# Patient Record
Sex: MALE | Race: WHITE | Employment: FULL TIME | URBAN - METROPOLITAN AREA
[De-identification: names, ages, dates, MRNs, and addresses within clinical notes are randomized per-mention and may not be internally consistent; named-entity substitution may affect disease eponyms.]

---

## 2017-05-30 ENCOUNTER — HOSPITAL ENCOUNTER (OUTPATIENT)
Dept: OTHER | Age: 52
Discharge: HOME | End: 2017-05-30
Attending: INTERNAL MEDICINE | Admitting: INTERNAL MEDICINE

## 2017-05-30 DIAGNOSIS — D64.9 NORMOCHROMIC NORMOCYTIC ANEMIA: ICD-10-CM

## 2017-05-30 DIAGNOSIS — E78.5 ELEVATED LIPIDS: ICD-10-CM

## 2017-05-30 LAB
A/G RATIO: 1.8 (ref 1.1–2.2)
ALBUMIN SERPL-MCNC: 4.4 G/DL (ref 3.4–5)
ALP BLD-CCNC: 47 U/L (ref 40–129)
ALT SERPL-CCNC: 14 U/L (ref 10–40)
ANION GAP SERPL CALCULATED.3IONS-SCNC: 9 MMOL/L (ref 3–16)
AST SERPL-CCNC: 16 U/L (ref 15–37)
BILIRUB SERPL-MCNC: 0.5 MG/DL (ref 0–1)
BUN BLDV-MCNC: 19 MG/DL (ref 7–20)
CALCIUM SERPL-MCNC: 9.4 MG/DL (ref 8.3–10.6)
CHLORIDE BLD-SCNC: 101 MMOL/L (ref 99–110)
CHOLESTEROL, TOTAL: 201 MG/DL (ref 0–199)
CO2: 28 MMOL/L (ref 21–32)
CREAT SERPL-MCNC: 0.9 MG/DL (ref 0.9–1.3)
GFR AFRICAN AMERICAN: >60
GFR NON-AFRICAN AMERICAN: >60
GLOBULIN: 2.4 G/DL
GLUCOSE BLD-MCNC: 98 MG/DL (ref 70–99)
HCT VFR BLD CALC: 40 % (ref 40.5–52.5)
HDLC SERPL-MCNC: 46 MG/DL (ref 40–60)
HEMOGLOBIN: 13.6 G/DL (ref 13.5–17.5)
LDL CHOLESTEROL CALCULATED: 135 MG/DL
MCH RBC QN AUTO: 29.8 PG (ref 26–34)
MCHC RBC AUTO-ENTMCNC: 34.1 G/DL (ref 31–36)
MCV RBC AUTO: 87.4 FL (ref 80–100)
PDW BLD-RTO: 12.4 % (ref 12.4–15.4)
PLATELET # BLD: 212 K/UL (ref 135–450)
PMV BLD AUTO: 8.7 FL (ref 5–10.5)
POTASSIUM SERPL-SCNC: 4.5 MMOL/L (ref 3.5–5.1)
RBC # BLD: 4.57 M/UL (ref 4.2–5.9)
SODIUM BLD-SCNC: 138 MMOL/L (ref 136–145)
TOTAL PROTEIN: 6.8 G/DL (ref 6.4–8.2)
TRIGL SERPL-MCNC: 99 MG/DL (ref 0–150)
VLDLC SERPL CALC-MCNC: 20 MG/DL
WBC # BLD: 5.8 K/UL (ref 4–11)

## 2017-06-01 ENCOUNTER — OFFICE VISIT (OUTPATIENT)
Dept: INTERNAL MEDICINE CLINIC | Age: 52
End: 2017-06-01

## 2017-06-01 VITALS
DIASTOLIC BLOOD PRESSURE: 88 MMHG | BODY MASS INDEX: 27.92 KG/M2 | WEIGHT: 195 LBS | OXYGEN SATURATION: 97 % | HEART RATE: 80 BPM | HEIGHT: 70 IN | SYSTOLIC BLOOD PRESSURE: 138 MMHG

## 2017-06-01 DIAGNOSIS — H61.22 IMPACTED CERUMEN OF LEFT EAR: ICD-10-CM

## 2017-06-01 DIAGNOSIS — N50.819 TESTICULAR DISCOMFORT: ICD-10-CM

## 2017-06-01 DIAGNOSIS — K22.719 BARRETT'S ESOPHAGUS WITH DYSPLASIA: ICD-10-CM

## 2017-06-01 DIAGNOSIS — G47.00 INSOMNIA, UNSPECIFIED TYPE: ICD-10-CM

## 2017-06-01 DIAGNOSIS — L98.9 SKIN LESION: ICD-10-CM

## 2017-06-01 DIAGNOSIS — G47.30 SLEEP APNEA, UNSPECIFIED TYPE: ICD-10-CM

## 2017-06-01 DIAGNOSIS — Z13.9 SCREENING: ICD-10-CM

## 2017-06-01 DIAGNOSIS — E78.5 ELEVATED LIPIDS: Primary | ICD-10-CM

## 2017-06-01 PROCEDURE — 99214 OFFICE O/P EST MOD 30 MIN: CPT | Performed by: INTERNAL MEDICINE

## 2017-06-01 RX ORDER — TRAZODONE HYDROCHLORIDE 50 MG/1
TABLET ORAL
Qty: 30 TABLET | Refills: 5 | Status: SHIPPED | OUTPATIENT
Start: 2017-06-01 | End: 2017-12-12 | Stop reason: ALTCHOICE

## 2017-06-02 ENCOUNTER — OFFICE VISIT (OUTPATIENT)
Dept: INTERNAL MEDICINE CLINIC | Age: 52
End: 2017-06-02

## 2017-06-02 VITALS
HEIGHT: 70 IN | BODY MASS INDEX: 27.92 KG/M2 | WEIGHT: 195 LBS | DIASTOLIC BLOOD PRESSURE: 78 MMHG | SYSTOLIC BLOOD PRESSURE: 120 MMHG

## 2017-06-02 DIAGNOSIS — H61.22 IMPACTED CERUMEN OF LEFT EAR: Primary | ICD-10-CM

## 2017-06-02 PROCEDURE — 99212 OFFICE O/P EST SF 10 MIN: CPT | Performed by: NURSE PRACTITIONER

## 2017-08-03 ENCOUNTER — TELEPHONE (OUTPATIENT)
Dept: INTERNAL MEDICINE CLINIC | Age: 52
End: 2017-08-03

## 2017-12-11 ENCOUNTER — HOSPITAL ENCOUNTER (OUTPATIENT)
Dept: OTHER | Age: 52
Discharge: HOME | End: 2017-12-11
Attending: INTERNAL MEDICINE | Admitting: INTERNAL MEDICINE

## 2017-12-11 DIAGNOSIS — E78.5 ELEVATED LIPIDS: ICD-10-CM

## 2017-12-11 DIAGNOSIS — Z13.9 SCREENING FOR CONDITION: ICD-10-CM

## 2017-12-11 LAB
A/G RATIO: 1.8 (ref 1.1–2.2)
ALBUMIN SERPL-MCNC: 4.2 G/DL (ref 3.4–5)
ALP BLD-CCNC: 46 U/L (ref 40–129)
ALT SERPL-CCNC: 12 U/L (ref 10–40)
ANION GAP SERPL CALCULATED.3IONS-SCNC: 12 MMOL/L (ref 3–16)
AST SERPL-CCNC: 17 U/L (ref 15–37)
BILIRUB SERPL-MCNC: <0.2 MG/DL (ref 0–1)
BUN BLDV-MCNC: 18 MG/DL (ref 7–20)
CALCIUM SERPL-MCNC: 9.4 MG/DL (ref 8.3–10.6)
CHLORIDE BLD-SCNC: 104 MMOL/L (ref 99–110)
CHOLESTEROL, TOTAL: 192 MG/DL (ref 0–199)
CO2: 27 MMOL/L (ref 21–32)
CREAT SERPL-MCNC: 0.8 MG/DL (ref 0.9–1.3)
GFR AFRICAN AMERICAN: >60
GFR NON-AFRICAN AMERICAN: >60
GLOBULIN: 2.3 G/DL
GLUCOSE BLD-MCNC: 72 MG/DL (ref 70–99)
HDLC SERPL-MCNC: 52 MG/DL (ref 40–60)
LDL CHOLESTEROL CALCULATED: 116 MG/DL
POTASSIUM SERPL-SCNC: 4.8 MMOL/L (ref 3.5–5.1)
PROSTATE SPECIFIC ANTIGEN: 1.66 NG/ML (ref 0–4)
SODIUM BLD-SCNC: 143 MMOL/L (ref 136–145)
TOTAL PROTEIN: 6.5 G/DL (ref 6.4–8.2)
TRIGL SERPL-MCNC: 121 MG/DL (ref 0–150)
VLDLC SERPL CALC-MCNC: 24 MG/DL

## 2017-12-12 ENCOUNTER — OFFICE VISIT (OUTPATIENT)
Dept: INTERNAL MEDICINE CLINIC | Age: 52
End: 2017-12-12

## 2017-12-12 VITALS
BODY MASS INDEX: 23.34 KG/M2 | HEART RATE: 63 BPM | HEIGHT: 70 IN | OXYGEN SATURATION: 98 % | DIASTOLIC BLOOD PRESSURE: 64 MMHG | WEIGHT: 163 LBS | SYSTOLIC BLOOD PRESSURE: 134 MMHG

## 2017-12-12 DIAGNOSIS — K90.0 CELIAC SPRUE: ICD-10-CM

## 2017-12-12 DIAGNOSIS — E78.5 ELEVATED LIPIDS: Primary | ICD-10-CM

## 2017-12-12 DIAGNOSIS — G47.30 SLEEP APNEA, UNSPECIFIED TYPE: ICD-10-CM

## 2017-12-12 DIAGNOSIS — Z23 NEED FOR IMMUNIZATION AGAINST INFLUENZA: ICD-10-CM

## 2017-12-12 DIAGNOSIS — K22.719 BARRETT'S ESOPHAGUS WITH DYSPLASIA: ICD-10-CM

## 2017-12-12 PROCEDURE — 99214 OFFICE O/P EST MOD 30 MIN: CPT | Performed by: INTERNAL MEDICINE

## 2017-12-12 PROCEDURE — G8484 FLU IMMUNIZE NO ADMIN: HCPCS | Performed by: INTERNAL MEDICINE

## 2017-12-12 PROCEDURE — G8420 CALC BMI NORM PARAMETERS: HCPCS | Performed by: INTERNAL MEDICINE

## 2017-12-12 PROCEDURE — 3017F COLORECTAL CA SCREEN DOC REV: CPT | Performed by: INTERNAL MEDICINE

## 2017-12-12 PROCEDURE — G8427 DOCREV CUR MEDS BY ELIG CLIN: HCPCS | Performed by: INTERNAL MEDICINE

## 2017-12-12 PROCEDURE — 90471 IMMUNIZATION ADMIN: CPT | Performed by: INTERNAL MEDICINE

## 2017-12-12 PROCEDURE — 1036F TOBACCO NON-USER: CPT | Performed by: INTERNAL MEDICINE

## 2017-12-12 PROCEDURE — 90688 IIV4 VACCINE SPLT 0.5 ML IM: CPT | Performed by: INTERNAL MEDICINE

## 2017-12-13 NOTE — PROGRESS NOTES
Subjective:      Patient ID: Sharmon Nyhan is a 45 y.o. male. CC: Lipids  HPI: Patient with history of hyperlipidemia, insomnia, Harry's esophagus, sleep apnea. Reviewed last office visit 6/1/2017 at which time the patient complained of insomnia and started on trazodone before bedtime. States insomnia has improved and now is off trazodone as he \"doesn't need it\" anymore. On diet his weight is down 32 pounds since last office visit! Medicines and allergies reviewed  Health maintenance reviewed  Reviewed laboratory data 12/11/2017 chemistry panel: Unremarkable. Lipid panel: Total cholesterol: 192. LDL cholesterol: 116. PSA  1.66. Review of Systems     Review of Systems   Constitutional: negative   HENT: negative   EYES: negative   Respiratory: negative   Gastrointestinal: negative   Endocrine: negative   Musculoskeletal: negative   Skin: negative   Allergic/Immunological: negative   Hematological: negative   Psychiatric/Behavorial: negative   CV: negative   CNS: negative   :Negative   S/E:Negative  Renal: Negative               Objective:   Physical Exam: Lungs: Clear to auscultation. CV: S1-S2 normal.  VANITA. Carotid: No bruit. Head/neck: Neck: No lymphadenopathy. Thyroid: Not palpable. Eyes: EOMI, PERRLA without nystagnus. Spine/extremities: No ankle edema. Skin: No rash. Rectal Exam: No rectal masses. Scant amount of mucus present guaiac negative. Prostate exam: Size: Medium to large. Firmness: Medium. No definite masses. Nontender to touch. Blood pressure 134/64, pulse 63, height 5' 10\" (1.778 m), weight 163 lb (73.9 kg), SpO2 98 %. Assessment:     1. Insomnia: Improved now off trazodone  2. Lipids: Borderline elevated LDL cholesterol  3. Harry's: Stable  4. PRAVIN: Stable  5. Weight loss on diet         Plan:      1. Continue present medicines  2. Gave slip to obtain fasting laboratory studies before next office visit  3.   Influenza vaccine given today after discussing risk

## 2017-12-19 ENCOUNTER — HOSPITAL ENCOUNTER (OUTPATIENT)
Dept: SURGERY | Age: 52
Discharge: HOME | End: 2017-12-19
Attending: INTERNAL MEDICINE | Admitting: INTERNAL MEDICINE

## 2017-12-19 VITALS
RESPIRATION RATE: 16 BRPM | TEMPERATURE: 97.3 F | OXYGEN SATURATION: 96 % | HEART RATE: 59 BPM | DIASTOLIC BLOOD PRESSURE: 70 MMHG | SYSTOLIC BLOOD PRESSURE: 120 MMHG

## 2017-12-19 DIAGNOSIS — Z12.11 ENCOUNTER FOR SCREENING FOR MALIGNANT NEOPLASM OF COLON: ICD-10-CM

## 2017-12-19 RX ORDER — LIDOCAINE HYDROCHLORIDE 10 MG/ML
0.1 INJECTION, SOLUTION EPIDURAL; INFILTRATION; INTRACAUDAL; PERINEURAL
Status: ACTIVE | OUTPATIENT
Start: 2017-12-19 | End: 2017-12-19

## 2017-12-19 RX ORDER — SODIUM CHLORIDE, SODIUM LACTATE, POTASSIUM CHLORIDE, CALCIUM CHLORIDE 600; 310; 30; 20 MG/100ML; MG/100ML; MG/100ML; MG/100ML
INJECTION, SOLUTION INTRAVENOUS ONCE
Status: DISCONTINUED | OUTPATIENT
Start: 2017-12-19 | End: 2017-12-20 | Stop reason: HOSPADM

## 2017-12-19 ASSESSMENT — PAIN SCALES - GENERAL: PAINLEVEL_OUTOF10: 0

## 2017-12-19 ASSESSMENT — PAIN - FUNCTIONAL ASSESSMENT: PAIN_FUNCTIONAL_ASSESSMENT: 0-10

## 2017-12-19 NOTE — PROGRESS NOTES
Discharge instructions reviewed with patient/responsible adult and understanding verbalized. Discharge instructions signed and copies given. Patient discharged home with belongings. Pt. Left ambulatory without difficulty to go home.

## 2017-12-19 NOTE — H&P
Pre-sedation Assessment    History and Physical / Pre-Sedation Assessment  Patient:  Akila Castillo   :   1965     Intended Procedure: colonoscopy      HPI: Family h/o colon CA    Nurses notes reviewed and agreed. Medications reviewed  Allergies: Allergies   Allergen Reactions    Zyrtec [Cetirizine] Palpitations           Physical Exam:  Vital Signs: /68   Pulse 56   Temp 97.3 °F (36.3 °C) (Temporal)   Resp 12   SpO2 98%  There is no height or weight on file to calculate BMI. Airway:Normal  Cardiac:Normal  Pulmonary:Normal  Abdomen:Normal  Specific to procedure: none      Pre-Procedure Assessment/Plan:  ASA 2 - Patient with mild systemic disease with no functional limitations    Level of Sedation Plan: Moderate sedation    Post Procedure plan: Return to same level of care    I assessed the patient and find that the patient is in satisfactory condition to proceed with the planned procedure and sedation plan. I have explained the risk, benefits, and alternatives to the procedure. The patient understands and agrees to proceed.   Yes    Antonio Car MD       O) [phone]        Antonio Car  7:32 AM 2017

## 2018-01-09 ENCOUNTER — OFFICE VISIT (OUTPATIENT)
Dept: CARDIOLOGY CLINIC | Age: 53
End: 2018-01-09

## 2018-01-09 VITALS
HEART RATE: 65 BPM | SYSTOLIC BLOOD PRESSURE: 150 MMHG | OXYGEN SATURATION: 98 % | BODY MASS INDEX: 23.62 KG/M2 | DIASTOLIC BLOOD PRESSURE: 80 MMHG | WEIGHT: 165 LBS | HEIGHT: 70 IN

## 2018-01-09 DIAGNOSIS — R93.1 ELEVATED CORONARY ARTERY CALCIUM SCORE: Primary | ICD-10-CM

## 2018-01-09 DIAGNOSIS — E78.2 MIXED HYPERLIPIDEMIA: ICD-10-CM

## 2018-01-09 PROCEDURE — G8484 FLU IMMUNIZE NO ADMIN: HCPCS | Performed by: INTERNAL MEDICINE

## 2018-01-09 PROCEDURE — G8427 DOCREV CUR MEDS BY ELIG CLIN: HCPCS | Performed by: INTERNAL MEDICINE

## 2018-01-09 PROCEDURE — 3017F COLORECTAL CA SCREEN DOC REV: CPT | Performed by: INTERNAL MEDICINE

## 2018-01-09 PROCEDURE — 99214 OFFICE O/P EST MOD 30 MIN: CPT | Performed by: INTERNAL MEDICINE

## 2018-01-09 PROCEDURE — 1036F TOBACCO NON-USER: CPT | Performed by: INTERNAL MEDICINE

## 2018-01-09 PROCEDURE — G8420 CALC BMI NORM PARAMETERS: HCPCS | Performed by: INTERNAL MEDICINE

## 2018-01-09 NOTE — PROGRESS NOTES
Noted  Neurological/Psychiatric:  · Alert and oriented in all spheres  · Moves all extremities well  · Exhibits normal gait balance and coordination  · No abnormalities of mood, affect, memory, mentation, or behavior are noted    Normal stress echocardiogram study. 10/2015  Calcium score measures 1672. 10/2015    Assessment:      Elevated coronary artery calcium score - remains asymptomatic    Elevated lipids - suboptimal but on high dose statin     Family history of early CAD     Normochromic normocytic anemia     Sleep apnea        HTN - suboptimal, states has been normal when ck at PCP office    Plan:  1. Check BP and heart rate daily with an arm cuff. If consistently greater than 140/90, call office in 1 month with trend   2. Continue to exercise routinely and follow a healthy diet   3. Continue aspirin and crestor  4. Lipid panel with PCP  5. Follow up in 1 year      Roberto Stapleton M.D., Philipp Marks

## 2018-01-09 NOTE — COMMUNICATION BODY
[facility]   Cardiac Consultation    Referring Provider:  Alyssa Bernard MD     Chief Complaint   Patient presents with    Hyperlipidemia     1 yr follow up. 12/11/17 lipids        History of Present Illness:  FU for CAD, HTN, elev BP. Today he states he has been feeling well. He has been exercising including lifting weights without difficulty. He has been watching his diet. He does not check his BP at home but states has been normal at MD offices. He is tolerating his mediations. He denies chest pain, SOB, dizziness,or syncope. Past Medical History:   has a past medical history of Allergic; Anemia; Harry's esophagus; Celiac sprue; Family history of early CAD; Hyperlipidemia; Obesity; Seasonal allergies; and Unspecified sleep apnea. Surgical History:   has a past surgical history that includes Tonsillectomy; Upper gastrointestinal endoscopy (06/30/2003); Upper gastrointestinal endoscopy (11/02/2010); Upper gastrointestinal endoscopy (9/12/2014); Colonoscopy (06/30/2003); Colonoscopy (08/20/2009); Colonoscopy (11/05/2012); and Colonoscopy (12/19/2017). Social History:   reports that he has never smoked. He has never used smokeless tobacco. He reports that he drinks about 4.2 oz of alcohol per week . He reports that he does not use drugs. Family History:  family history includes Cancer (age of onset: 58) in his mother; Heart Disease (age of onset: 54) in his father; High Cholesterol in his father and sister. Home Medications:  Prior to Admission medications    Medication Sig Start Date End Date Taking? Authorizing Provider   CRESTOR 40 MG tablet TAKE ONE TABLET BY MOUTH DAILY FOR HIGH CHOLESTEROL 11/13/16  Yes Edwin Reddy MD   ezetimibe (ZETIA) 10 MG tablet TAKE ONE TABLET BY MOUTH EVERY DAY FOR HIGH CHOLESTEROL 5/17/16  Yes Edwin Reddy MD   aspirin 81 MG tablet Take 81 mg by mouth daily.    Yes Historical Provider, MD        Allergies:  Zyrtec [cetirizine]     Review of Systems: · Constitutional: there has been no unanticipated weight loss. There's been no change in energy level, sleep pattern, or activity level. · Eyes: No visual changes or diplopia. No scleral icterus. · ENT: No Headaches, hearing loss or vertigo. No mouth sores or sore throat. · Cardiovascular: Reviewed in HPI  · Respiratory: No cough or wheezing, no sputum production. No hematemesis. · Gastrointestinal: No abdominal pain, appetite loss, blood in stools. No change in bowel or bladder habits. · Genitourinary: No dysuria, trouble voiding, or hematuria. · Musculoskeletal:  No gait disturbance, weakness or joint complaints. · Integumentary: No rash or pruritis. · Neurological: No headache, diplopia, change in muscle strength, numbness or tingling. No change in gait, balance, coordination, mood, affect, memory, mentation, behavior. · Psychiatric: No anxiety, no depression. · Endocrine: No malaise, fatigue or temperature intolerance. No excessive thirst, fluid intake, or urination. No tremor. · Hematologic/Lymphatic: No abnormal bruising or bleeding, blood clots or swollen lymph nodes. · Allergic/Immunologic: No nasal congestion or hives. Physical Examination:    Vitals:    01/09/18 0749   BP: (!) 150/80   Pulse: 65   SpO2: 98%        Constitutional and General Appearance: NAD   Respiratory:  · Normal excursion and expansion without use of accessory muscles  · Resp Auscultation: Normal breath sounds without dullness  Cardiovascular:  · The apical impulses not displaced  · Heart tones are crisp and normal  · Cervical veins are not engorged  · The carotid upstroke is normal in amplitude and contour without delay or bruit  · Normal S1S2, No S3, No Murmur  · Peripheral pulses are symmetrical and full  · There is no clubbing, cyanosis of the extremities.   · No edema  · Femoral Arteries: 2+ and equal  · Pedal Pulses: 2+ and equal   Abdomen:  · No masses or tenderness  · Liver/Spleen: No Abnormalities Noted  Neurological/Psychiatric:  · Alert and oriented in all spheres  · Moves all extremities well  · Exhibits normal gait balance and coordination  · No abnormalities of mood, affect, memory, mentation, or behavior are noted    Normal stress echocardiogram study. 10/2015  Calcium score measures 1672. 10/2015    Assessment:      Elevated coronary artery calcium score - remains asymptomatic    Elevated lipids - suboptimal but on high dose statin     Family history of early CAD     Normochromic normocytic anemia     Sleep apnea        HTN - suboptimal, states has been normal when ck at PCP office    Plan:  1. Check BP and heart rate daily with an arm cuff. If consistently greater than 140/90, call office in 1 month with trend   2. Continue to exercise routinely and follow a healthy diet   3. Continue aspirin and crestor  4. Lipid panel with PCP  5. Follow up in 1 year      Mathew G. Dwyane Spurling, M.D., Hamzah Page

## 2018-01-09 NOTE — LETTER
[address] Cardiology - [address] [place] [address] [address] [address]  Phone: [phone]  Fax: [phone]    Kelsi Figueroa MD        January 9, 2018     Rosario Serrano MD  [address] [facility] [address]    Patient: Poncho Castle  MR Number: [id number]  YOB: 1965  Date of Visit: 1/9/2018    Dear Dr. Rosario Serrano:      Jessica    Cardiac Consultation    Referring Provider:  Rosario Serrano MD     Chief Complaint   Patient presents with    Hyperlipidemia     1 yr follow up. 12/11/17 lipids        History of Present Illness:  FU for CAD, HTN, elev BP. Today he states he has been feeling well. He has been exercising including lifting weights without difficulty. He has been watching his diet. He does not check his BP at home but states has been normal at MD offices. He is tolerating his mediations. He denies chest pain, SOB, dizziness,or syncope. Past Medical History:   has a past medical history of Allergic; Anemia; Harry's esophagus; Celiac sprue; Family history of early CAD; Hyperlipidemia; Obesity; Seasonal allergies; and Unspecified sleep apnea. Surgical History:   has a past surgical history that includes Tonsillectomy; Upper gastrointestinal endoscopy (06/30/2003); Upper gastrointestinal endoscopy (11/02/2010); Upper gastrointestinal endoscopy (9/12/2014); Colonoscopy (06/30/2003); Colonoscopy (08/20/2009); Colonoscopy (11/05/2012); and Colonoscopy (12/19/2017). Social History:   reports that he has never smoked. He has never used smokeless tobacco. He reports that he drinks about 4.2 oz of alcohol per week . He reports that he does not use drugs. Family History:  family history includes Cancer (age of onset: 58) in his mother; Heart Disease (age of onset: 54) in his father; High Cholesterol in his father and sister. Home Medications:  Prior to Admission medications    Medication Sig Start Date End Date Taking?  Authorizing Provider

## 2018-05-09 RX ORDER — ROSUVASTATIN CALCIUM 40 MG/1
TABLET, COATED ORAL
Qty: 90 TABLET | Refills: 2 | Status: SHIPPED | OUTPATIENT
Start: 2018-05-09 | End: 2019-04-14 | Stop reason: SDUPTHER

## 2018-06-12 ENCOUNTER — HOSPITAL ENCOUNTER (OUTPATIENT)
Dept: OTHER | Age: 53
Discharge: HOME | End: 2018-06-12
Attending: INTERNAL MEDICINE | Admitting: INTERNAL MEDICINE

## 2018-06-12 DIAGNOSIS — E78.5 ELEVATED LIPIDS: ICD-10-CM

## 2018-06-12 LAB
A/G RATIO: 1.8 (ref 1.1–2.2)
ALBUMIN SERPL-MCNC: 4.5 G/DL (ref 3.4–5)
ALP BLD-CCNC: 50 U/L (ref 40–129)
ALT SERPL-CCNC: 31 U/L (ref 10–40)
ANION GAP SERPL CALCULATED.3IONS-SCNC: 15 MMOL/L (ref 3–16)
AST SERPL-CCNC: 33 U/L (ref 15–37)
BILIRUB SERPL-MCNC: 0.5 MG/DL (ref 0–1)
BUN BLDV-MCNC: 24 MG/DL (ref 7–20)
CALCIUM SERPL-MCNC: 9.2 MG/DL (ref 8.3–10.6)
CHLORIDE BLD-SCNC: 102 MMOL/L (ref 99–110)
CHOLESTEROL, TOTAL: 176 MG/DL (ref 0–199)
CO2: 25 MMOL/L (ref 21–32)
CREAT SERPL-MCNC: 0.9 MG/DL (ref 0.9–1.3)
GFR AFRICAN AMERICAN: >60
GFR NON-AFRICAN AMERICAN: >60
GLOBULIN: 2.5 G/DL
GLUCOSE BLD-MCNC: 85 MG/DL (ref 70–99)
HDLC SERPL-MCNC: 53 MG/DL (ref 40–60)
LDL CHOLESTEROL CALCULATED: 108 MG/DL
POTASSIUM SERPL-SCNC: 4.5 MMOL/L (ref 3.5–5.1)
SODIUM BLD-SCNC: 142 MMOL/L (ref 136–145)
TOTAL PROTEIN: 7 G/DL (ref 6.4–8.2)
TRIGL SERPL-MCNC: 74 MG/DL (ref 0–150)
VLDLC SERPL CALC-MCNC: 15 MG/DL

## 2018-06-14 ENCOUNTER — OFFICE VISIT (OUTPATIENT)
Dept: INTERNAL MEDICINE CLINIC | Age: 53
End: 2018-06-14

## 2018-06-14 VITALS
BODY MASS INDEX: 24.54 KG/M2 | SYSTOLIC BLOOD PRESSURE: 122 MMHG | WEIGHT: 171 LBS | HEART RATE: 71 BPM | DIASTOLIC BLOOD PRESSURE: 72 MMHG | OXYGEN SATURATION: 98 %

## 2018-06-14 DIAGNOSIS — K90.0 CELIAC SPRUE: ICD-10-CM

## 2018-06-14 DIAGNOSIS — Z12.5 SPECIAL SCREENING FOR MALIGNANT NEOPLASM OF PROSTATE: ICD-10-CM

## 2018-06-14 DIAGNOSIS — Z82.49 FAMILY HISTORY OF EARLY CAD: ICD-10-CM

## 2018-06-14 DIAGNOSIS — K22.719 BARRETT'S ESOPHAGUS WITH DYSPLASIA: ICD-10-CM

## 2018-06-14 DIAGNOSIS — E78.5 ELEVATED LIPIDS: Primary | ICD-10-CM

## 2018-06-14 DIAGNOSIS — R93.1 ELEVATED CORONARY ARTERY CALCIUM SCORE: ICD-10-CM

## 2018-06-14 DIAGNOSIS — G47.30 SLEEP APNEA, UNSPECIFIED TYPE: ICD-10-CM

## 2018-06-14 PROCEDURE — 99213 OFFICE O/P EST LOW 20 MIN: CPT | Performed by: INTERNAL MEDICINE

## 2018-06-14 ASSESSMENT — PATIENT HEALTH QUESTIONNAIRE - PHQ9
2. FEELING DOWN, DEPRESSED OR HOPELESS: 0
SUM OF ALL RESPONSES TO PHQ QUESTIONS 1-9: 0
SUM OF ALL RESPONSES TO PHQ9 QUESTIONS 1 & 2: 0
1. LITTLE INTEREST OR PLEASURE IN DOING THINGS: 0

## 2019-01-09 ENCOUNTER — HOSPITAL ENCOUNTER (OUTPATIENT)
Age: 54
Discharge: HOME | End: 2019-01-09
Payer: COMMERCIAL

## 2019-01-09 DIAGNOSIS — E78.5 ELEVATED LIPIDS: ICD-10-CM

## 2019-01-09 DIAGNOSIS — Z12.5 SPECIAL SCREENING FOR MALIGNANT NEOPLASM OF PROSTATE: ICD-10-CM

## 2019-01-09 LAB
A/G RATIO: 2 (ref 1.1–2.2)
ALBUMIN SERPL-MCNC: 4.6 G/DL (ref 3.4–5)
ALP BLD-CCNC: 42 U/L (ref 40–129)
ALT SERPL-CCNC: 14 U/L (ref 10–40)
ANION GAP SERPL CALCULATED.3IONS-SCNC: 13 MMOL/L (ref 3–16)
AST SERPL-CCNC: 16 U/L (ref 15–37)
BILIRUB SERPL-MCNC: 0.4 MG/DL (ref 0–1)
BUN BLDV-MCNC: 18 MG/DL (ref 7–20)
CALCIUM SERPL-MCNC: 9.4 MG/DL (ref 8.3–10.6)
CHLORIDE BLD-SCNC: 105 MMOL/L (ref 99–110)
CHOLESTEROL, TOTAL: 235 MG/DL (ref 0–199)
CO2: 25 MMOL/L (ref 21–32)
CREAT SERPL-MCNC: 0.9 MG/DL (ref 0.9–1.3)
GFR AFRICAN AMERICAN: >60
GFR NON-AFRICAN AMERICAN: >60
GLOBULIN: 2.3 G/DL
GLUCOSE BLD-MCNC: 91 MG/DL (ref 70–99)
HDLC SERPL-MCNC: 58 MG/DL (ref 40–60)
LDL CHOLESTEROL CALCULATED: 167 MG/DL
POTASSIUM SERPL-SCNC: 4.9 MMOL/L (ref 3.5–5.1)
PROSTATE SPECIFIC ANTIGEN: 1.14 NG/ML (ref 0–4)
SODIUM BLD-SCNC: 143 MMOL/L (ref 136–145)
TOTAL PROTEIN: 6.9 G/DL (ref 6.4–8.2)
TRIGL SERPL-MCNC: 50 MG/DL (ref 0–150)
VLDLC SERPL CALC-MCNC: 10 MG/DL

## 2019-01-09 PROCEDURE — 36415 COLL VENOUS BLD VENIPUNCTURE: CPT

## 2019-01-09 PROCEDURE — 80053 COMPREHEN METABOLIC PANEL: CPT

## 2019-01-09 PROCEDURE — 80061 LIPID PANEL: CPT

## 2019-01-09 PROCEDURE — 84153 ASSAY OF PSA TOTAL: CPT

## 2019-01-29 ENCOUNTER — OFFICE VISIT (OUTPATIENT)
Dept: INTERNAL MEDICINE CLINIC | Age: 54
End: 2019-01-29
Payer: COMMERCIAL

## 2019-01-29 VITALS
WEIGHT: 186 LBS | DIASTOLIC BLOOD PRESSURE: 84 MMHG | OXYGEN SATURATION: 96 % | SYSTOLIC BLOOD PRESSURE: 144 MMHG | BODY MASS INDEX: 26.63 KG/M2 | HEART RATE: 78 BPM | HEIGHT: 70 IN

## 2019-01-29 DIAGNOSIS — R93.1 ELEVATED CORONARY ARTERY CALCIUM SCORE: ICD-10-CM

## 2019-01-29 DIAGNOSIS — Z23 NEED FOR PROPHYLACTIC VACCINATION AND INOCULATION AGAINST INFLUENZA: ICD-10-CM

## 2019-01-29 DIAGNOSIS — G47.30 SLEEP APNEA, UNSPECIFIED TYPE: ICD-10-CM

## 2019-01-29 DIAGNOSIS — E78.5 ELEVATED LIPIDS: Primary | ICD-10-CM

## 2019-01-29 DIAGNOSIS — K22.719 BARRETT'S ESOPHAGUS WITH DYSPLASIA: ICD-10-CM

## 2019-01-29 DIAGNOSIS — Z82.49 FAMILY HISTORY OF EARLY CAD: ICD-10-CM

## 2019-01-29 PROCEDURE — 90662 IIV NO PRSV INCREASED AG IM: CPT | Performed by: INTERNAL MEDICINE

## 2019-01-29 PROCEDURE — 99214 OFFICE O/P EST MOD 30 MIN: CPT | Performed by: INTERNAL MEDICINE

## 2019-01-29 PROCEDURE — 90471 IMMUNIZATION ADMIN: CPT | Performed by: INTERNAL MEDICINE

## 2019-02-15 ENCOUNTER — OFFICE VISIT (OUTPATIENT)
Dept: CARDIOLOGY CLINIC | Age: 54
End: 2019-02-15
Payer: COMMERCIAL

## 2019-02-15 VITALS
HEART RATE: 70 BPM | HEIGHT: 70 IN | SYSTOLIC BLOOD PRESSURE: 110 MMHG | WEIGHT: 192.4 LBS | DIASTOLIC BLOOD PRESSURE: 70 MMHG | OXYGEN SATURATION: 96 % | BODY MASS INDEX: 27.54 KG/M2

## 2019-02-15 DIAGNOSIS — R93.1 ELEVATED CORONARY ARTERY CALCIUM SCORE: Primary | ICD-10-CM

## 2019-02-15 DIAGNOSIS — E78.2 MIXED HYPERLIPIDEMIA: ICD-10-CM

## 2019-02-15 DIAGNOSIS — Z82.49 FAMILY HISTORY OF EARLY CAD: ICD-10-CM

## 2019-02-15 PROCEDURE — 99214 OFFICE O/P EST MOD 30 MIN: CPT | Performed by: INTERNAL MEDICINE

## 2019-02-27 ENCOUNTER — TELEPHONE (OUTPATIENT)
Dept: CARDIOLOGY CLINIC | Age: 54
End: 2019-02-27

## 2019-03-13 ENCOUNTER — TELEPHONE (OUTPATIENT)
Dept: CARDIOLOGY CLINIC | Age: 54
End: 2019-03-13

## 2019-04-14 RX ORDER — ROSUVASTATIN CALCIUM 40 MG/1
TABLET, COATED ORAL
Qty: 90 TABLET | Refills: 1 | Status: SHIPPED | OUTPATIENT
Start: 2019-04-14 | End: 2019-10-04 | Stop reason: SDUPTHER

## 2019-05-20 ENCOUNTER — TELEPHONE (OUTPATIENT)
Dept: CARDIOLOGY CLINIC | Age: 54
End: 2019-05-20

## 2019-05-20 NOTE — TELEPHONE ENCOUNTER
Patient called regarding his praluent. Per telephone encounter on 3/13/19 it looks like this was approved. Patient is calling because he has not received the medication yet.

## 2019-05-22 ENCOUNTER — TELEPHONE (OUTPATIENT)
Dept: CARDIOLOGY CLINIC | Age: 54
End: 2019-05-22

## 2019-05-22 NOTE — TELEPHONE ENCOUNTER
Pt sts he has never heard anything back about alirocumab (PRALUENT) 75 MG/ML SOPN injection pen. Pharmacy or office. Gave pt pharmacy number to see what happen with him receiving medication since it was approved. Pt will call back if he needs anything from us.

## 2019-05-24 NOTE — TELEPHONE ENCOUNTER
2/15/19 Dr. Parekh Link:  1. Check BP and heart rate daily. 2. Continue to exercise routinely and follow a healthy diet   3. Start repatha 140 mg/ml every 14 days for elevated LDL  4.  Follow up 1 year

## 2019-06-17 ENCOUNTER — HOSPITAL ENCOUNTER (OUTPATIENT)
Age: 54
Discharge: HOME | End: 2019-06-17
Payer: COMMERCIAL

## 2019-06-17 ENCOUNTER — OFFICE VISIT (OUTPATIENT)
Dept: INTERNAL MEDICINE CLINIC | Age: 54
End: 2019-06-17
Payer: COMMERCIAL

## 2019-06-17 VITALS
SYSTOLIC BLOOD PRESSURE: 128 MMHG | HEIGHT: 70 IN | RESPIRATION RATE: 16 BRPM | HEART RATE: 77 BPM | WEIGHT: 188.6 LBS | OXYGEN SATURATION: 97 % | BODY MASS INDEX: 27 KG/M2 | DIASTOLIC BLOOD PRESSURE: 78 MMHG

## 2019-06-17 DIAGNOSIS — E78.5 ELEVATED LIPIDS: ICD-10-CM

## 2019-06-17 DIAGNOSIS — K21.00 GASTROESOPHAGEAL REFLUX DISEASE WITH ESOPHAGITIS: ICD-10-CM

## 2019-06-17 DIAGNOSIS — L98.9 SKIN LESION OF RIGHT ARM: ICD-10-CM

## 2019-06-17 DIAGNOSIS — E78.5 ELEVATED LIPIDS: Primary | ICD-10-CM

## 2019-06-17 LAB
CHOLESTEROL, TOTAL: 130 MG/DL (ref 0–199)
HDLC SERPL-MCNC: 56 MG/DL (ref 40–60)
LDL CHOLESTEROL CALCULATED: 54 MG/DL
TRIGL SERPL-MCNC: 100 MG/DL (ref 0–150)
VLDLC SERPL CALC-MCNC: 20 MG/DL

## 2019-06-17 PROCEDURE — 80061 LIPID PANEL: CPT

## 2019-06-17 PROCEDURE — 99214 OFFICE O/P EST MOD 30 MIN: CPT | Performed by: NURSE PRACTITIONER

## 2019-06-17 PROCEDURE — 36415 COLL VENOUS BLD VENIPUNCTURE: CPT

## 2019-06-17 RX ORDER — PANTOPRAZOLE SODIUM 20 MG/1
20 TABLET, DELAYED RELEASE ORAL
Qty: 30 TABLET | Refills: 0 | Status: SHIPPED | OUTPATIENT
Start: 2019-06-17 | End: 2019-12-16 | Stop reason: CLARIF

## 2019-06-17 RX ORDER — EZETIMIBE 10 MG/1
TABLET ORAL
Qty: 30 TABLET | Refills: 9 | Status: SHIPPED | OUTPATIENT
Start: 2019-06-17 | End: 2019-07-29 | Stop reason: ALTCHOICE

## 2019-06-17 ASSESSMENT — PATIENT HEALTH QUESTIONNAIRE - PHQ9
2. FEELING DOWN, DEPRESSED OR HOPELESS: 0
1. LITTLE INTEREST OR PLEASURE IN DOING THINGS: 0
SUM OF ALL RESPONSES TO PHQ9 QUESTIONS 1 & 2: 0
SUM OF ALL RESPONSES TO PHQ QUESTIONS 1-9: 0
SUM OF ALL RESPONSES TO PHQ QUESTIONS 1-9: 0

## 2019-06-17 ASSESSMENT — ENCOUNTER SYMPTOMS
NAUSEA: 0
CHEST TIGHTNESS: 0
SINUS PAIN: 0
ABDOMINAL PAIN: 1
COLOR CHANGE: 1
CONSTIPATION: 0

## 2019-06-17 NOTE — PROGRESS NOTES
[address] [facility] Internal Medicine  [address]  Tel:[phone]    Domingo Lemons is a 52 y.o. male who presents today for hismedical conditions/complaints as noted below. Domingo Lemons is c/o of Blood Pressure Check (3 Month F/U)    Chief Complaint   Patient presents with    Blood Pressure Check     3 Month F/U     HPI:     Mr. Darin Villarreal is here for follow up of BP and HLD. He denies current concerns with his medications. He states he has fallen out of regular exercise as he has had some deaths in the family. He has been doing well but fell out within the last 2-3 weeks. His diet is relatively stable and includes lean proteins/veggies. Denies any side effects since starting Parluent but is worried about cost once his discount card is  as the medication will be somewhere in the neighborhood of 1500/month. Denies side effects of high intensity statin. Denies shortness of breath, chest pain/pressure, unilateral weakness, persistent/constant numbness/tingling, altered mental status, slurred speech, or change in any senses. He also complains of a skin lesion of the right lateral triceps area. The lesion has been present for 3 months. Lesion has not changed. Symptoms associated with the lesion are: itching. Patient denies increasing diameter, increasing thickness, increasing number of lesions, darkening color. Subjective:     Review of Systems   HENT: Negative for sinus pain. Respiratory: Negative for chest tightness. Cardiovascular: Negative for chest pain, palpitations and leg swelling. Gastrointestinal: Positive for abdominal pain (epigastric discomfort). Negative for constipation and nausea. Genitourinary: Negative for dysuria and urgency. Musculoskeletal: Negative for arthralgias and myalgias. Skin: Positive for color change. Neurological: Negative for dizziness and weakness.      Objective:     Vitals:    19 0802   BP: 128/78   Site: Left Upper Arm   Position: Sitting   Cuff Size: Medium Adult   Pulse: 77   Resp: 16   SpO2: 97%   Weight: 188 lb 9.6 oz (85.5 kg)   Height: 5' 10\" (1.778 m)     Physical Exam   Constitutional: Vital signs are normal. He appears well-developed and well-nourished. HENT:   Head: Normocephalic and atraumatic. Right Ear: Hearing and external ear normal.   Left Ear: Hearing and external ear normal.   Nose: Nose normal.   Eyes: Pupils are equal, round, and reactive to light. Lids are normal.   Neck: Normal range of motion. Cardiovascular: Normal rate, regular rhythm, S1 normal, S2 normal, normal heart sounds and normal pulses. No extrasystoles are present. PMI is not displaced. Exam reveals no gallop, no S3, no S4, no distant heart sounds and no friction rub. No murmur heard. No systolic murmur is present. No diastolic murmur is present. Pulmonary/Chest: Effort normal and breath sounds normal. No accessory muscle usage. No respiratory distress. He has no decreased breath sounds. He has no wheezes. He has no rhonchi. He has no rales. Abdominal: Soft. Normal appearance and bowel sounds are normal. There is tenderness in the epigastric area. Neurological: He is alert. Skin: Skin is warm, dry and intact. Psychiatric: He has a normal mood and affect. His speech is normal.   Nursing note and vitals reviewed. Assessment & Plan: The following diagnoses and conditions are stable with no further orders unlessindicated:    1. Elevated lipids    2. Gastroesophageal reflux disease with esophagitis    3. Skin lesion of right arm      Karon Becerra was seen today for blood pressure check. Diagnoses and all orders for this visit:    Elevated lipids  -     LIPID PANEL; Future  -     ezetimibe (ZETIA) 10 MG tablet; TAKE ONE TABLET BY MOUTH EVERY DAY FOR HIGH CHOLESTEROL  -     LIPID PANEL; Future    Will add on zetia for further lipid improvement.  He states he stopped before as cost was too high,

## 2019-07-09 ENCOUNTER — OFFICE VISIT (OUTPATIENT)
Dept: INTERNAL MEDICINE CLINIC | Age: 54
End: 2019-07-09
Payer: COMMERCIAL

## 2019-07-09 VITALS
DIASTOLIC BLOOD PRESSURE: 80 MMHG | BODY MASS INDEX: 26.4 KG/M2 | OXYGEN SATURATION: 98 % | HEART RATE: 68 BPM | SYSTOLIC BLOOD PRESSURE: 128 MMHG | WEIGHT: 184 LBS

## 2019-07-09 DIAGNOSIS — R10.30 LOWER ABDOMINAL PAIN: Primary | ICD-10-CM

## 2019-07-09 PROCEDURE — 99214 OFFICE O/P EST MOD 30 MIN: CPT | Performed by: NURSE PRACTITIONER

## 2019-07-09 ASSESSMENT — ENCOUNTER SYMPTOMS
FLATUS: 1
CRAMPS: 1
DIARRHEA: 1
COUGH: 0
HEMATOCHEZIA: 0
CHEST TIGHTNESS: 0
ABDOMINAL PAIN: 1
SHORTNESS OF BREATH: 0
SINUS PAIN: 0
CONSTIPATION: 0
BLOATING: 1
VOMITING: 0
BELCHING: 1
NAUSEA: 1
SORE THROAT: 0

## 2019-07-29 ENCOUNTER — OFFICE VISIT (OUTPATIENT)
Dept: CARDIOLOGY CLINIC | Age: 54
End: 2019-07-29
Payer: COMMERCIAL

## 2019-07-29 ENCOUNTER — HOSPITAL ENCOUNTER (OUTPATIENT)
Dept: CT IMAGING | Age: 54
Discharge: HOME | End: 2019-07-29
Payer: COMMERCIAL

## 2019-07-29 VITALS
HEART RATE: 59 BPM | HEIGHT: 70 IN | WEIGHT: 185.4 LBS | BODY MASS INDEX: 26.54 KG/M2 | SYSTOLIC BLOOD PRESSURE: 134 MMHG | OXYGEN SATURATION: 98 % | DIASTOLIC BLOOD PRESSURE: 76 MMHG

## 2019-07-29 DIAGNOSIS — R10.30 LOWER ABDOMINAL PAIN: ICD-10-CM

## 2019-07-29 DIAGNOSIS — Z82.49 FAMILY HISTORY OF EARLY CAD: Primary | ICD-10-CM

## 2019-07-29 DIAGNOSIS — I25.10 CORONARY ARTERY DISEASE INVOLVING NATIVE CORONARY ARTERY OF NATIVE HEART WITHOUT ANGINA PECTORIS: ICD-10-CM

## 2019-07-29 DIAGNOSIS — E78.5 ELEVATED LIPIDS: ICD-10-CM

## 2019-07-29 DIAGNOSIS — R07.2 PRECORDIAL PAIN: ICD-10-CM

## 2019-07-29 PROCEDURE — 74176 CT ABD & PELVIS W/O CONTRAST: CPT

## 2019-07-29 PROCEDURE — 99214 OFFICE O/P EST MOD 30 MIN: CPT | Performed by: INTERNAL MEDICINE

## 2019-07-29 NOTE — PROGRESS NOTES
Taking? Authorizing Provider   pantoprazole (PROTONIX) 20 MG tablet Take 1 tablet by mouth every morning (before breakfast) 6/17/19  Yes ANAYELI Jimenez CNP   ezetimibe (ZETIA) 10 MG tablet TAKE ONE TABLET BY MOUTH EVERY DAY FOR HIGH CHOLESTEROL 6/17/19  Yes ANAYELI Jimenez CNP   PRALUENT 75 MG/ML SOPN injection pen INJECT CONTENTS OF 1 PEN UNDER THE SKIN EVERY 14 DAYS 5/24/19  Yes Amanda Pena MD   rosuvastatin (CRESTOR) 40 MG tablet TAKE ONE TABLET BY MOUTH DAILY FOR HIGH CHOLESTEROL 4/14/19  Yes Lawson Reddy MD   aspirin 81 MG tablet Take 81 mg by mouth daily. Yes Historical Provider, MD        Allergies:  Statins and Zyrtec [cetirizine]     Review of Systems:   · Constitutional: there has been no unanticipated weight loss. There's been no change in energy level, sleep pattern, or activity level. · Eyes: No visual changes or diplopia. No scleral icterus. · ENT: No Headaches, hearing loss or vertigo. No mouth sores or sore throat. · Cardiovascular: Reviewed in HPI  · Respiratory: No cough or wheezing, no sputum production. No hematemesis. · Gastrointestinal: No abdominal pain, appetite loss, blood in stools. No change in bowel or bladder habits. · Genitourinary: No dysuria, trouble voiding, or hematuria. · Musculoskeletal:  No gait disturbance, weakness or joint complaints. · Integumentary: No rash or pruritis. · Neurological: No headache, diplopia, change in muscle strength, numbness or tingling. No change in gait, balance, coordination, mood, affect, memory, mentation, behavior. · Psychiatric: No anxiety, no depression. · Endocrine: No malaise, fatigue or temperature intolerance. No excessive thirst, fluid intake, or urination. No tremor. · Hematologic/Lymphatic: No abnormal bruising or bleeding, blood clots or swollen lymph nodes. · Allergic/Immunologic: No nasal congestion or hives.     Physical Examination:    Vitals:    07/29/19 1346   BP: 134/76   Pulse: 59   SpO2: 98% Constitutional and General Appearance: NAD   Respiratory:  · Normal excursion and expansion without use of accessory muscles  · Resp Auscultation: Normal breath sounds without dullness  Cardiovascular:  · The apical impulses not displaced  · Heart tones are crisp and normal  · Cervical veins are not engorged  · The carotid upstroke is normal in amplitude and contour without delay or bruit  · Normal S1S2, No S3, No Murmur  · Peripheral pulses are symmetrical and full  · There is no clubbing, cyanosis of the extremities. · No edema  · Femoral Arteries: 2+ and equal  · Pedal Pulses: 2+ and equal   Abdomen:  · No masses or tenderness  · Liver/Spleen: No Abnormalities Noted  Neurological/Psychiatric:  · Alert and oriented in all spheres  · Moves all extremities well  · Exhibits normal gait balance and coordination  · No abnormalities of mood, affect, memory, mentation, or behavior are noted    Normal stress echocardiogram study. 10/2015  Calcium score measures 1672. 10/2015    Assessment:      CAD based on elevated coronary artery calcium score. Possible progression    Elevated lipids - suboptimal but on high dose statin. Stopped Zetia due to cost    Family history of early CAD     Normochromic normocytic anemia     Sleep apnea        HTN - suboptimal, states has been normal when ck at PCP office       Chest pain - atypical.        Abnormal EKG    Plan:  Continue off praluent and see if symptoms resolve   Continue Crestor 40 mg   Stop Zetia   Stress test- GXT Myoview   Check blood pressure at home weekly  Regular exercise and following a healthy diet encouraged   Follow up with me in 3-4 weeks         Scribe's attestation: This note was scribed in the presence of Dr. Phoebe Cobb M.D. By Sagar Niño RN     The scribes docuementation has been prepared under my direction and personally reviewed by me in its entirety.   I confirm that the note above accurately reflects all work, treatment, procedures, and medical

## 2019-07-29 NOTE — LETTER
Medication Sig Start Date End Date Taking? Authorizing Provider   pantoprazole (PROTONIX) 20 MG tablet Take 1 tablet by mouth every morning (before breakfast) 6/17/19  Yes ANAYELI Shah CNP   ezetimibe (ZETIA) 10 MG tablet TAKE ONE TABLET BY MOUTH EVERY DAY FOR HIGH CHOLESTEROL 6/17/19  Yes ANAYELI Shah CNP   PRALUENT 75 MG/ML SOPN injection pen INJECT CONTENTS OF 1 PEN UNDER THE SKIN EVERY 14 DAYS 5/24/19  Yes Stefani Tee MD   rosuvastatin (CRESTOR) 40 MG tablet TAKE ONE TABLET BY MOUTH DAILY FOR HIGH CHOLESTEROL 4/14/19  Yes Abdelrahman Reddy MD   aspirin 81 MG tablet Take 81 mg by mouth daily. Yes Historical Provider, MD        Allergies:  Statins and Zyrtec [cetirizine]     Review of Systems:   · Constitutional: there has been no unanticipated weight loss. There's been no change in energy level, sleep pattern, or activity level. · Eyes: No visual changes or diplopia. No scleral icterus. · ENT: No Headaches, hearing loss or vertigo. No mouth sores or sore throat. · Cardiovascular: Reviewed in HPI  · Respiratory: No cough or wheezing, no sputum production. No hematemesis. · Gastrointestinal: No abdominal pain, appetite loss, blood in stools. No change in bowel or bladder habits. · Genitourinary: No dysuria, trouble voiding, or hematuria. · Musculoskeletal:  No gait disturbance, weakness or joint complaints. · Integumentary: No rash or pruritis. · Neurological: No headache, diplopia, change in muscle strength, numbness or tingling. No change in gait, balance, coordination, mood, affect, memory, mentation, behavior. · Psychiatric: No anxiety, no depression. · Endocrine: No malaise, fatigue or temperature intolerance. No excessive thirst, fluid intake, or urination. No tremor. · Hematologic/Lymphatic: No abnormal bruising or bleeding, blood clots or swollen lymph nodes. · Allergic/Immunologic: No nasal congestion or hives.     Physical Examination:    Vitals:    07/29/19 1346 BP: 134/76   Pulse: 59   SpO2: 98%        Constitutional and General Appearance: NAD   Respiratory:  · Normal excursion and expansion without use of accessory muscles  · Resp Auscultation: Normal breath sounds without dullness  Cardiovascular:  · The apical impulses not displaced  · Heart tones are crisp and normal  · Cervical veins are not engorged  · The carotid upstroke is normal in amplitude and contour without delay or bruit  · Normal S1S2, No S3, No Murmur  · Peripheral pulses are symmetrical and full  · There is no clubbing, cyanosis of the extremities. · No edema  · Femoral Arteries: 2+ and equal  · Pedal Pulses: 2+ and equal   Abdomen:  · No masses or tenderness  · Liver/Spleen: No Abnormalities Noted  Neurological/Psychiatric:  · Alert and oriented in all spheres  · Moves all extremities well  · Exhibits normal gait balance and coordination  · No abnormalities of mood, affect, memory, mentation, or behavior are noted    Normal stress echocardiogram study. 10/2015  Calcium score measures 1672. 10/2015    Assessment:      CAD based on elevated coronary artery calcium score. Possible progression    Elevated lipids - suboptimal but on high dose statin. Stopped Zetia due to cost    Family history of early CAD     Normochromic normocytic anemia     Sleep apnea        HTN - suboptimal, states has been normal when ck at PCP office       Chest pain - atypical.        Abnormal EKG    Plan:  Continue off praluent and see if symptoms resolve   Continue Crestor 40 mg   Stop Zetia   Stress test- GXT Myoview   Check blood pressure at home weekly  Regular exercise and following a healthy diet encouraged   Follow up with me in 3-4 weeks         Scribe's attestation: This note was scribed in the presence of Dr. Ana Paula Perkins M.D. By Valorie Moon RN     The scribes docuementation has been prepared under my direction and personally reviewed by me in its entirety.   I confirm that the note above accurately reflects all work, treatment, procedures, and medical decision making performed by me. MD Ana Valdovinos.  Magdalena Zurita M.D., Apurva [place]

## 2019-09-19 ENCOUNTER — HOSPITAL ENCOUNTER (OUTPATIENT)
Dept: CARDIOLOGY | Age: 54
Discharge: HOME | End: 2019-09-19
Payer: COMMERCIAL

## 2019-09-19 VITALS — HEIGHT: 70 IN | BODY MASS INDEX: 26.48 KG/M2 | WEIGHT: 185 LBS

## 2019-09-19 DIAGNOSIS — R07.2 PRECORDIAL PAIN: ICD-10-CM

## 2019-09-19 LAB
LV EF: 61 %
LVEF MODALITY: NORMAL

## 2019-09-19 PROCEDURE — 78452 HT MUSCLE IMAGE SPECT MULT: CPT

## 2019-09-19 PROCEDURE — 93017 CV STRESS TEST TRACING ONLY: CPT

## 2019-09-19 PROCEDURE — A9502 TC99M TETROFOSMIN: HCPCS | Performed by: INTERNAL MEDICINE

## 2019-09-19 PROCEDURE — 3430000000 HC RX DIAGNOSTIC RADIOPHARMACEUTICAL: Performed by: INTERNAL MEDICINE

## 2019-09-19 RX ADMIN — TETROFOSMIN 33 MILLICURIE: 1.38 INJECTION, POWDER, LYOPHILIZED, FOR SOLUTION INTRAVENOUS at 10:20

## 2019-09-19 RX ADMIN — TETROFOSMIN 11.2 MILLICURIE: 1.38 INJECTION, POWDER, LYOPHILIZED, FOR SOLUTION INTRAVENOUS at 08:58

## 2019-09-20 ENCOUNTER — TELEPHONE (OUTPATIENT)
Dept: CARDIOLOGY CLINIC | Age: 54
End: 2019-09-20

## 2019-10-04 ENCOUNTER — OFFICE VISIT (OUTPATIENT)
Dept: CARDIOLOGY CLINIC | Age: 54
End: 2019-10-04
Payer: COMMERCIAL

## 2019-10-04 VITALS
HEIGHT: 70 IN | SYSTOLIC BLOOD PRESSURE: 138 MMHG | HEART RATE: 67 BPM | OXYGEN SATURATION: 98 % | DIASTOLIC BLOOD PRESSURE: 68 MMHG | BODY MASS INDEX: 26.57 KG/M2 | WEIGHT: 185.6 LBS

## 2019-10-04 DIAGNOSIS — E78.2 MIXED HYPERLIPIDEMIA: ICD-10-CM

## 2019-10-04 DIAGNOSIS — I25.10 CORONARY ARTERY DISEASE INVOLVING NATIVE CORONARY ARTERY OF NATIVE HEART WITHOUT ANGINA PECTORIS: Primary | ICD-10-CM

## 2019-10-04 PROCEDURE — 99214 OFFICE O/P EST MOD 30 MIN: CPT | Performed by: INTERNAL MEDICINE

## 2019-10-04 RX ORDER — ROSUVASTATIN CALCIUM 40 MG/1
TABLET, COATED ORAL
Qty: 90 TABLET | Refills: 3 | Status: SHIPPED | OUTPATIENT
Start: 2019-10-04 | End: 2020-12-01

## 2019-12-16 ENCOUNTER — OFFICE VISIT (OUTPATIENT)
Dept: INTERNAL MEDICINE CLINIC | Age: 54
End: 2019-12-16
Payer: COMMERCIAL

## 2019-12-16 VITALS — OXYGEN SATURATION: 98 % | DIASTOLIC BLOOD PRESSURE: 72 MMHG | SYSTOLIC BLOOD PRESSURE: 128 MMHG | HEART RATE: 82 BPM

## 2019-12-16 DIAGNOSIS — Z23 NEED FOR PROPHYLACTIC VACCINATION WITH TETANUS-DIPHTHERIA (TD): ICD-10-CM

## 2019-12-16 DIAGNOSIS — E78.5 ELEVATED LIPIDS: Primary | ICD-10-CM

## 2019-12-16 DIAGNOSIS — G47.30 SLEEP APNEA, UNSPECIFIED TYPE: ICD-10-CM

## 2019-12-16 DIAGNOSIS — Z11.59 SPECIAL SCREENING EXAMINATION FOR VIRAL DISEASE: ICD-10-CM

## 2019-12-16 DIAGNOSIS — Z12.5 SPECIAL SCREENING FOR MALIGNANT NEOPLASM OF PROSTATE: ICD-10-CM

## 2019-12-16 DIAGNOSIS — K22.719 BARRETT'S ESOPHAGUS WITH DYSPLASIA: ICD-10-CM

## 2019-12-16 PROCEDURE — 90714 TD VACC NO PRESV 7 YRS+ IM: CPT | Performed by: INTERNAL MEDICINE

## 2019-12-16 PROCEDURE — 90471 IMMUNIZATION ADMIN: CPT | Performed by: INTERNAL MEDICINE

## 2019-12-16 PROCEDURE — 99214 OFFICE O/P EST MOD 30 MIN: CPT | Performed by: INTERNAL MEDICINE

## 2019-12-16 PROCEDURE — 90686 IIV4 VACC NO PRSV 0.5 ML IM: CPT | Performed by: INTERNAL MEDICINE

## 2019-12-16 PROCEDURE — 90472 IMMUNIZATION ADMIN EACH ADD: CPT | Performed by: INTERNAL MEDICINE

## 2020-02-04 ENCOUNTER — OFFICE VISIT (OUTPATIENT)
Dept: DERMATOLOGY | Age: 55
End: 2020-02-04
Payer: COMMERCIAL

## 2020-02-04 PROCEDURE — 99202 OFFICE O/P NEW SF 15 MIN: CPT | Performed by: DERMATOLOGY

## 2020-02-04 NOTE — LETTER
[facility]  [address] [place] [address]  Phone: [phone]  Fax: [phone]    Rosemarie Goncalves MD        February 4, 2020     Lindsay Newberry MD  [address] [facility] [address]    Patient: Desiree Mtz  MR Number: <[id number]>  YOB: 1965  Date of Visit: 2/4/2020    Dear Dr. Lindsay Newberry:    Thank you for the request for consultation for Desiree Mtz to me for the evaluation of moles. Below are the relevant portions of my assessment and plan of care. If you have questions, please do not hesitate to call me. I look forward to following Iris Montez along with you.     Sincerely,        Rosemarie Goncalves MD

## 2020-02-04 NOTE — PATIENT INSTRUCTIONS
Please email me through [email or web address] or call office if any spot or mole changes, defined as becoming larger, darker, multi-colored, or becomes symptomatic (bleeding, tender, etc). Please be mindful of your sun protection strategies, including use of broad spectrum sunscreen with SPF of at least 30, sun guard clothing with UPF (available at most [employer]), broad brimmed hat, sunglasses, and sun avoidance during peak hours of the day. ABCDE's of melanoma to take note of:     ASYMMETRY OF MOLE,   BORDER STRANGE,   COLOR CHANGING OR BECOMING DARKER,   DIAMETER ENLARGING,   EVOLUTION (EXISTING MOLE IS CHANGING WITH ANY OF THE AFOREMENTIONED CHANGES, MOLE IS BLEEDING OR TENDER, OR NEW MOLES DEVELOPING SUDDENLY)    Also, please be sure to see dentist twice yearly and ensure someone (whether it's me,  your PCP, or gynecologist) is looking at genitals once yearly. Melanoma and other skin cancers can occur anywhere!

## 2020-02-04 NOTE — PROGRESS NOTES
[facility] Dermat[facility], [facility]) Physicians    Previous clinic visit: none    CC:  mole check    HPI:    1.)  Here tdoay for partial skin exam. Defers exam of his legs. Denies new, changing, or sx nevi. No personal or family h/o atypical nevi or skin cancers. Feels well. Notes there was a lesion on his R upper arm months ago but has since self resolved. DERM HISTORY:   Personal history of NMSC or MM- no    Family history of NMSC or MM- no   Sunburns easily- Yes   Uses sunscreen- Yes  History of tanning bed use- No    ADDITIONAL HISTORY:    I have reviewed past medical and surgical histories, current medications, allergies, social and family histories as documented in the patient's electronic medical record. Current Outpatient Medications   Medication Sig Dispense Refill    rosuvastatin (CRESTOR) 40 MG tablet TAKE ONE TABLET BY MOUTH DAILY FOR HIGH CHOLESTEROL 90 tablet 3    aspirin 81 MG tablet Take 81 mg by mouth daily. No current facility-administered medications for this visit.         ROS:    General: No fevers, chills, sweats, unexplained weight loss or weight gain, fatigue, malaise   Skin: Denies additional lesions    Heme: No history of bleeding diatheses    Allergy: Denies seasonal or environmental allergies or other medication allergies     PHYSICAL EXAM:    General: Well-appearing, NAD    Neurologic/psychiatric: Patient is AOx3; mood and affect are appropriate and congruent  Eyes: conjunctivae and eyelids without erythema, injection, or discharge   Integument: Examination was performed of the following and were unremarkable except as otherwise noted below:scalp, hair, face, ears, mucosa of gums/teeth/cutaneous and mucosal lips, buccal mucosa, oropharynx, neck, breast/axilla/chest, abdomen,  Back, RUE, LUE, digits/nails, apocrine/eccrine glands; genital exam deferred per pt request    Abnormalities noted include:    1.) Torso and extremities with several variably sized scattered brown to tan round smooth melanocytic macules and papules  2.)  Photoexposed areas with numerous ovoid tan macules    ASSESSMENT AND PLAN:    1.)  Multiple scattered acquired melanocytic nevi with banal features:   - Reassurance  - Edu: patient regarding sun protection strategies, including use of broad spectrum sunscreen with SPF of at least 30, sun guard clothing, broad brimmed hat, sunglasses, and sun avoidance during peak hours of the day. ABCDE's of melanoma also reviewed    2.)  Solar lentiginosis:  - Sun smarts as above       Return to Clinic:  prn changes  Discussed plan with patient and/or primary caretaker. Patient to call clinic with any questions / concerns. Reviewed side effects of treatment(s) and/or medication(s) with patient and/or primary caretaker.    AVS provided or is available on [employer]   ____________________________________________________________________________   Ugo Martinez MD, MPH, FAAD  [facility] DERMATOLOGY, [address] [address]

## 2020-06-22 ENCOUNTER — HOSPITAL ENCOUNTER (OUTPATIENT)
Age: 55
Discharge: HOME | End: 2020-06-22
Payer: COMMERCIAL

## 2020-06-22 LAB
A/G RATIO: 1.9 (ref 1.1–2.2)
ALBUMIN SERPL-MCNC: 4.3 G/DL (ref 3.4–5)
ALP BLD-CCNC: 48 U/L (ref 40–129)
ALT SERPL-CCNC: 21 U/L (ref 10–40)
ANION GAP SERPL CALCULATED.3IONS-SCNC: 13 MMOL/L (ref 3–16)
AST SERPL-CCNC: 22 U/L (ref 15–37)
BILIRUB SERPL-MCNC: 0.4 MG/DL (ref 0–1)
BUN BLDV-MCNC: 16 MG/DL (ref 7–20)
CALCIUM SERPL-MCNC: 8.9 MG/DL (ref 8.3–10.6)
CHLORIDE BLD-SCNC: 104 MMOL/L (ref 99–110)
CHOLESTEROL, TOTAL: 177 MG/DL (ref 0–199)
CO2: 25 MMOL/L (ref 21–32)
CREAT SERPL-MCNC: 0.9 MG/DL (ref 0.9–1.3)
GFR AFRICAN AMERICAN: >60
GFR NON-AFRICAN AMERICAN: >60
GLOBULIN: 2.3 G/DL
GLUCOSE BLD-MCNC: 99 MG/DL (ref 70–99)
HDLC SERPL-MCNC: 41 MG/DL (ref 40–60)
HEPATITIS C ANTIBODY INTERPRETATION: NORMAL
LDL CHOLESTEROL CALCULATED: 121 MG/DL
POTASSIUM SERPL-SCNC: 4.6 MMOL/L (ref 3.5–5.1)
PROSTATE SPECIFIC ANTIGEN: 1.17 NG/ML (ref 0–4)
SODIUM BLD-SCNC: 142 MMOL/L (ref 136–145)
TOTAL PROTEIN: 6.6 G/DL (ref 6.4–8.2)
TRIGL SERPL-MCNC: 75 MG/DL (ref 0–150)
VLDLC SERPL CALC-MCNC: 15 MG/DL

## 2020-06-22 PROCEDURE — 36415 COLL VENOUS BLD VENIPUNCTURE: CPT

## 2020-06-22 PROCEDURE — 84153 ASSAY OF PSA TOTAL: CPT

## 2020-06-22 PROCEDURE — 80061 LIPID PANEL: CPT

## 2020-06-22 PROCEDURE — 80053 COMPREHEN METABOLIC PANEL: CPT

## 2020-06-22 PROCEDURE — 86803 HEPATITIS C AB TEST: CPT

## 2020-07-01 ENCOUNTER — OFFICE VISIT (OUTPATIENT)
Dept: PRIMARY CARE CLINIC | Age: 55
End: 2020-07-01
Payer: COMMERCIAL

## 2020-07-01 PROCEDURE — 99211 OFF/OP EST MAY X REQ PHY/QHP: CPT | Performed by: NURSE PRACTITIONER

## 2020-07-01 PROCEDURE — G8428 CUR MEDS NOT DOCUMENT: HCPCS | Performed by: NURSE PRACTITIONER

## 2020-07-01 PROCEDURE — G8419 CALC BMI OUT NRM PARAM NOF/U: HCPCS | Performed by: NURSE PRACTITIONER

## 2020-07-01 NOTE — PROGRESS NOTES
Cornelia Yen received a viral test for COVID-19. They were educated on isolation and quarantine as appropriate. For any symptoms, they were directed to seek care from their PCP, given contact information to establish with a doctor, directed to an urgent care or the emergency room.

## 2020-07-03 LAB
SARS-COV-2: NOT DETECTED
SOURCE: NORMAL

## 2020-12-01 ENCOUNTER — TELEPHONE (OUTPATIENT)
Dept: CARDIOLOGY CLINIC | Age: 55
End: 2020-12-01

## 2020-12-01 RX ORDER — ROSUVASTATIN CALCIUM 40 MG/1
TABLET, COATED ORAL
Qty: 90 TABLET | Refills: 1 | Status: SHIPPED | OUTPATIENT
Start: 2020-12-01 | End: 2021-06-30

## 2020-12-01 NOTE — TELEPHONE ENCOUNTER
Spoke with pt and [place] is [place] [facility] [place] at 315p

## 2020-12-01 NOTE — TELEPHONE ENCOUNTER
[place] [facility]    [place] upcoming appt,  will contact pt for an appt.

## 2020-12-07 ENCOUNTER — VIRTUAL VISIT (OUTPATIENT)
Dept: CARDIOLOGY CLINIC | Age: 55
End: 2020-12-07
Payer: COMMERCIAL

## 2020-12-07 PROCEDURE — G8421 BMI NOT CALCULATED: HCPCS | Performed by: INTERNAL MEDICINE

## 2020-12-07 PROCEDURE — G8427 DOCREV CUR MEDS BY ELIG CLIN: HCPCS | Performed by: INTERNAL MEDICINE

## 2020-12-07 PROCEDURE — 99214 OFFICE O/P EST MOD 30 MIN: CPT | Performed by: INTERNAL MEDICINE

## 2020-12-07 PROCEDURE — 3017F COLORECTAL CA SCREEN DOC REV: CPT | Performed by: INTERNAL MEDICINE

## 2020-12-07 PROCEDURE — 1036F TOBACCO NON-USER: CPT | Performed by: INTERNAL MEDICINE

## 2020-12-07 PROCEDURE — G8484 FLU IMMUNIZE NO ADMIN: HCPCS | Performed by: INTERNAL MEDICINE

## 2020-12-07 RX ORDER — AMLODIPINE BESYLATE 5 MG/1
5 TABLET ORAL DAILY
Qty: 30 TABLET | Refills: 11 | Status: SHIPPED | OUTPATIENT
Start: 2020-12-07 | End: 2022-10-18 | Stop reason: SDUPTHER

## 2020-12-07 NOTE — PATIENT INSTRUCTIONS
Plan:  Start Norvasc (amlodipine) 5 mg daily   Repeat fasting lipids- if LDL remains high then will consider starting Zetia.  He was not able to afford Repatha or Praluent   CT calcium score   Continue other medications   Check blood pressure at home weekly  Regular exercise and following a healthy diet encouraged- Mediterranean diet    Follow up with me in 1 year

## 2020-12-07 NOTE — PROGRESS NOTES
2020    1 Year Follow Up; Coronary Artery Disease; Hyperlipidemia; and Hypertension       TELEHEALTH EVALUATION -- Audio/Visual (During KJWSS-31 public health emergency)    HPI: Matt Martínez is a 60 y.o. male who is here today for follow up for a history of coronary artery disease- based on CT calcium score 2015, hypertension, hyperlipidemia, family history of early heart diease. Today he states he has been feeling well. He states he has not been very active recently. His blood pressure has been running 140-160's/90's. Heart rate runs around 65 bpm. He is tolerating his medications. He has not been exercising over the last 3-4 months. He states his weight is up. Patient currently denies any edema, palpitations, chest pain, shortness of breath, dizziness, and syncope. Matt Martínez (:  1965) has requested an audio/video evaluation for the following concern(s): Follow up for CAD, HTN, and HLD. [x] Medications and dosages reviewed. ROS:  [x]Full ROS obtained and negative except as mentioned in HPI    Prior to Visit Medications    Medication Sig Taking? Authorizing Provider   rosuvastatin (CRESTOR) 40 MG tablet TAKE ONE TABLET BY MOUTH DAILY FOR HIGH CHOLESTEROL Yes Mallorie Jimenez MD   tamsulosin (FLOMAX) 0.4 MG capsule Take 1 capsule by mouth nightly 1/2 hour after PM meal.Prostate medicine Yes Varun Reddy MD   aspirin 81 MG tablet Take 81 mg by mouth daily. Yes Historical Provider, MD       Social History     Tobacco Use    Smoking status: Never Smoker    Smokeless tobacco: Never Used   Substance Use Topics    Alcohol use:  Yes     Alcohol/week: 7.0 standard drinks     Types: 7 Glasses of wine per week     Comment: 1 / day    Drug use: No          Allergies   Allergen Reactions    Zyrtec [Cetirizine] Palpitations   ,   Past Medical History:   Diagnosis Date    Allergic     Anemia before     Harry's esophagus     Celiac sprue     Family history of early CAD Father Death 54 MI    Hyperlipidemia     Obesity 3/18/2015    Seasonal allergies     Unspecified sleep apnea    ,   Past Surgical History:   Procedure Laterality Date    COLONOSCOPY  06/30/2003    Diverticulosis    COLONOSCOPY  08/20/2009    Diverticulosis/hyperplastic polyp    COLONOSCOPY  11/05/2012    Hyperplastic Polyp /Divertics REDO 5yrs    COLONOSCOPY  12/19/2017    poor prep / polyp    TONSILLECTOMY      UPPER GASTROINTESTINAL ENDOSCOPY  06/30/2003    Harry's, villous Atrophy (Negative Antibodies)    UPPER GASTROINTESTINAL ENDOSCOPY  11/02/2010    GERD w/o Harry's, Villous atrophy    UPPER GASTROINTESTINAL ENDOSCOPY  9/12/2014   ,   Social History     Tobacco Use    Smoking status: Never Smoker    Smokeless tobacco: Never Used   Substance Use Topics    Alcohol use:  Yes     Alcohol/week: 7.0 standard drinks     Types: 7 Glasses of wine per week     Comment: 1 / day    Drug use: No   ,   Family History   Problem Relation Age of Onset    Cancer Mother 58        colon    High Cholesterol Father     Heart Disease Father 54        MI    High Cholesterol Sister    ,   Immunization History   Administered Date(s) Administered    Influenza 10/10/2012, 10/04/2013    Influenza A (P7E0-18) Vaccine IM 09/21/2010    Influenza Virus Vaccine 09/29/2009, 01/08/2010, 01/17/2012, 10/15/2015    Influenza, High Dose (Fluzone 65 yrs and older) 01/29/2019    Influenza, Eliazar Reasons, IM, (6 mo and older Fluzone, Flulaval, Fluarix and 3 yrs and older Afluria) 11/22/2016, 12/12/2017    Influenza, Quadv, IM, PF (6 mo and older Fluzone, Flulaval, Fluarix, and 3 yrs and older Afluria) 12/16/2019    Td (Adult), 2 Lf Tetanus Toxoid, Pf (Td, Absorbed) 12/16/2019    Tdap (Boostrix, Adacel) 06/03/2009   ,   Health Maintenance   Topic Date Due    HIV screen  08/27/1978    Shingles Vaccine (1 of 2) 08/27/2013    Lipid screen  06/22/2021    Colon cancer screen colonoscopy  12/19/2027    DTaP/Tdap/Td vaccine (3 - Td) 12/16/2029    Flu vaccine  Completed    Hepatitis C screen  Completed    Hepatitis A vaccine  Aged Out    Hepatitis B vaccine  Aged Out    Hib vaccine  Aged Out    Meningococcal (ACWY) vaccine  Aged Out    Pneumococcal 0-64 years Vaccine  Aged Out       PHYSICAL EXAMINATION:  [ INSTRUCTIONS:  \"[x]\" Indicates a positive item  \"[]\" Indicates a negative item  -- DELETE ALL ITEMS NOT EXAMINED]  Vital Signs: (As obtained by patient/caregiver or practitioner observation)    Blood pressure- 163/93 Heart rate- 65   Respiratory rate-    Temperature-  Pulse oximetry-     Constitutional: [x] Appears well-developed and well-nourished [] No apparent distress      [] Abnormal-   Mental status  [x] Alert and awake  [] Oriented to person/place/time []Able to follow commands      Eyes:  EOM    [x]  Normal  [] Abnormal-  Sclera  [x]  Normal  [] Abnormal -         Discharge [x]  None visible  [] Abnormal -    HENT:   [x] Normocephalic, atraumatic. [] Abnormal   [x] Mouth/Throat: Mucous membranes are moist.     External Ears [x] Normal  [] Abnormal-     Neck: [x] No visualized mass     Pulmonary/Chest: [] Respiratory effort normal.  [] No visualized signs of difficulty breathing or respiratory distress        [] Abnormal-      Musculoskeletal:   [x] Normal gait with no signs of ataxia         [] Normal range of motion of neck        [] Abnormal-       Neurological:        [x] No Facial Asymmetry (Cranial nerve 7 motor function) (limited exam to video visit)          [x] No gaze palsy        [] Abnormal-         Skin:        [x] No significant exanthematous lesions or discoloration noted on facial skin         [] Abnormal-            Psychiatric:       [x] Normal Affect [] No Hallucinations        [] Abnormal-     Other pertinent observable physical exam findings-     Normal stress echocardiogram study.  10/2015  Calcium score measures 1672. 10/2015    Stress test 9/19/19  Summary  Normal myocardial perfusion study with normal left ventricular function,  size, and wall motion. The estimated left ventricular function is 61%. Assessment:      CAD based on elevated coronary artery calcium score. Stable. No symptoms    Elevated lipids - was well controlled on high dose statin. Last ck LDL elev again. Pralulent and Repatha not an option due to cost    Family history of early CAD     Normochromic normocytic anemia     Sleep apnea        HTN - suboptimal, states has been elev at home as well       Abnormal EKG    Plan:  Start Norvasc (amlodipine) 5 mg daily   Monitor BP at home. Goal <140/90. Call if not to goal.   Repeat fasting lipids- if LDL remains high then will consider starting Zetia. He was not able to afford Repatha or Praluent   Repeat CT calcium score   Continue other medications   Check blood pressure at home weekly  Regular exercise and following a healthy diet encouraged- Mediterranean diet    Follow up with me in 1 year       Inez Flores is a 60 y.o. male being evaluated by a Virtual Visit (video visit) encounter to address concerns as mentioned above. A caregiver was present when appropriate. Due to this being a TeleHealth encounter (During PNTWJ-08 public health emergency), evaluation of the following organ systems was limited: Vitals/Constitutional/EENT/Resp/CV/GI//MS/Neuro/Skin/Heme-Lymph-Imm. Pursuant to the emergency declaration under the [address] [place] [address] authority and the [employer] and [employer] General Act, this Virtual Visit was conducted with patient's (and/or legal guardian's) consent, to reduce the patient's risk of exposure to COVID-19 and provide necessary medical care. The patient (and/or legal guardian) has also been advised to contact this office for worsening conditions or problems, and seek emergency medical treatment and/or call 911 if deemed necessary. Scribe's attestation:   This note was scribed in the presence of  Nadege Trejo M.D. By Dank Castro RN     The scribes documentation has been prepared under my direction and personally reviewed by me in its entirety. I confirm that the note above accurately reflects all work, treatment, procedures, and medical decision making performed by me. Dr. Nadege Trejo MD      Services were provided through a video synchronous discussion virtually to substitute for in-person clinic visit. I am seeing the patient today from my office and the patient from their home. --Dank Castro RN on 12/7/2020 at 3:12 PM    An electronic signature was used to authenticate this note.

## 2020-12-07 NOTE — LETTER
2020    1 Year Follow Up; Coronary Artery Disease; Hyperlipidemia; and Hypertension       TELEHEALTH EVALUATION -- Audio/Visual (During XRMDH-38 public health emergency)    HPI: Brittany Arredondo is a 60 y.o. male who is here today for follow up for a history of coronary artery disease- based on CT calcium score 2015, hypertension, hyperlipidemia, family history of early heart diease. Today he states he has been feeling well. He states he has not been very active recently. His blood pressure has been running 140-160's/90's. Heart rate runs around 65 bpm. He is tolerating his medications. He has not been exercising over the last 3-4 months. He states his weight is up. Patient currently denies any edema, palpitations, chest pain, shortness of breath, dizziness, and syncope. Brittany Arredondo (:  1965) has requested an audio/video evaluation for the following concern(s): Follow up for CAD, HTN, and HLD. [x] Medications and dosages reviewed. ROS:  [x]Full ROS obtained and negative except as mentioned in HPI    Prior to Visit Medications    Medication Sig Taking? Authorizing Provider   rosuvastatin (CRESTOR) 40 MG tablet TAKE ONE TABLET BY MOUTH DAILY FOR HIGH CHOLESTEROL Yes Harvie Goodpasture, MD   tamsulosin (FLOMAX) 0.4 MG capsule Take 1 capsule by mouth nightly 1/2 hour after PM meal.Prostate medicine Yes Lalit Reddy MD   aspirin 81 MG tablet Take 81 mg by mouth daily. Yes Historical Provider, MD       Social History     Tobacco Use    Smoking status: Never Smoker    Smokeless tobacco: Never Used   Substance Use Topics    Alcohol use:  Yes     Alcohol/week: 7.0 standard drinks     Types: 7 Glasses of wine per week     Comment: 1 / day    Drug use: No          Allergies   Allergen Reactions    Zyrtec [Cetirizine] Palpitations   ,   Past Medical History:   Diagnosis Date    Allergic     Anemia before     Harry's esophagus 2003    Celiac sprue 2003    Family history of early CAD Father Death 54 MI    Hyperlipidemia     Obesity 3/18/2015    Seasonal allergies     Unspecified sleep apnea    ,   Past Surgical History:   Procedure Laterality Date    COLONOSCOPY  06/30/2003    Diverticulosis    COLONOSCOPY  08/20/2009    Diverticulosis/hyperplastic polyp    COLONOSCOPY  11/05/2012    Hyperplastic Polyp /Divertics REDO 5yrs    COLONOSCOPY  12/19/2017    poor prep / polyp    TONSILLECTOMY      UPPER GASTROINTESTINAL ENDOSCOPY  06/30/2003    Harry's, villous Atrophy (Negative Antibodies)    UPPER GASTROINTESTINAL ENDOSCOPY  11/02/2010    GERD w/o Harry's, Villous atrophy    UPPER GASTROINTESTINAL ENDOSCOPY  9/12/2014   ,   Social History     Tobacco Use    Smoking status: Never Smoker    Smokeless tobacco: Never Used   Substance Use Topics    Alcohol use:  Yes     Alcohol/week: 7.0 standard drinks     Types: 7 Glasses of wine per week     Comment: 1 / day    Drug use: No   ,   Family History   Problem Relation Age of Onset    Cancer Mother 58        colon    High Cholesterol Father     Heart Disease Father 54        MI    High Cholesterol Sister    ,   Immunization History   Administered Date(s) Administered    Influenza 10/10/2012, 10/04/2013    Influenza A (D7O8-65) Vaccine IM 09/21/2010    Influenza Virus Vaccine 09/29/2009, 01/08/2010, 01/17/2012, 10/15/2015    Influenza, High Dose (Fluzone 65 yrs and older) 01/29/2019    Influenza, Eliazar Reasons, IM, (6 mo and older Fluzone, Flulaval, Fluarix and 3 yrs and older Afluria) 11/22/2016, 12/12/2017    Influenza, Quadv, IM, PF (6 mo and older Fluzone, Flulaval, Fluarix, and 3 yrs and older Afluria) 12/16/2019    Td (Adult), 2 Lf Tetanus Toxoid, Pf (Td, Absorbed) 12/16/2019    Tdap (Boostrix, Adacel) 06/03/2009   ,   Health Maintenance   Topic Date Due    HIV screen  08/27/1978    Shingles Vaccine (1 of 2) 08/27/2013    Lipid screen  06/22/2021    Colon cancer screen colonoscopy  12/19/2027  DTaP/Tdap/Td vaccine (3 - Td) 12/16/2029    Flu vaccine  Completed    Hepatitis C screen  Completed    Hepatitis A vaccine  Aged Out    Hepatitis B vaccine  Aged Out    Hib vaccine  Aged Out    Meningococcal (ACWY) vaccine  Aged Out    Pneumococcal 0-64 years Vaccine  Aged Out       PHYSICAL EXAMINATION:  [ INSTRUCTIONS:  \"[x]\" Indicates a positive item  \"[]\" Indicates a negative item  -- DELETE ALL ITEMS NOT EXAMINED]  Vital Signs: (As obtained by patient/caregiver or practitioner observation)    Blood pressure- 163/93 Heart rate- 65   Respiratory rate-    Temperature-  Pulse oximetry-     Constitutional: [x] Appears well-developed and well-nourished [] No apparent distress      [] Abnormal-   Mental status  [x] Alert and awake  [] Oriented to person/place/time []Able to follow commands      Eyes:  EOM    [x]  Normal  [] Abnormal-  Sclera  [x]  Normal  [] Abnormal -         Discharge [x]  None visible  [] Abnormal -    HENT:   [x] Normocephalic, atraumatic. [] Abnormal   [x] Mouth/Throat: Mucous membranes are moist.     External Ears [x] Normal  [] Abnormal-     Neck: [x] No visualized mass     Pulmonary/Chest: [] Respiratory effort normal.  [] No visualized signs of difficulty breathing or respiratory distress        [] Abnormal-      Musculoskeletal:   [x] Normal gait with no signs of ataxia         [] Normal range of motion of neck        [] Abnormal-       Neurological:        [x] No Facial Asymmetry (Cranial nerve 7 motor function) (limited exam to video visit)          [x] No gaze palsy        [] Abnormal-         Skin:        [x] No significant exanthematous lesions or discoloration noted on facial skin         [] Abnormal-            Psychiatric:       [x] Normal Affect [] No Hallucinations        [] Abnormal-     Other pertinent observable physical exam findings-     Normal stress echocardiogram study.  10/2015  Calcium score measures 1672. 10/2015    Stress test 9/19/19 Summary  Normal myocardial perfusion study with normal left ventricular function,  size, and wall motion. The estimated left ventricular function is 61%. Assessment:      CAD based on elevated coronary artery calcium score. Stable. No symptoms    Elevated lipids - was well controlled on high dose statin. Last ck LDL elev again. Pralulent and Repatha not an option due to cost    Family history of early CAD     Normochromic normocytic anemia     Sleep apnea        HTN - suboptimal, states has been elev at home as well       Abnormal EKG    Plan:  Start Norvasc (amlodipine) 5 mg daily   Monitor BP at home. Goal <140/90. Call if not to goal.   Repeat fasting lipids- if LDL remains high then will consider starting Zetia. He was not able to afford Repatha or Praluent   Repeat CT calcium score   Continue other medications   Check blood pressure at home weekly  Regular exercise and following a healthy diet encouraged- Mediterranean diet    Follow up with me in 1 year       Palmira Nguyen is a 60 y.o. male being evaluated by a Virtual Visit (video visit) encounter to address concerns as mentioned above. A caregiver was present when appropriate. Due to this being a TeleHealth encounter (During [facility]-46 public health emergency), evaluation of the following organ systems was limited: Vitals/Constitutional/EENT/Resp/CV/GI//MS/Neuro/Skin/Heme-Lymph-Imm. Pursuant to the emergency declaration under the [facility], [address] [place] [address] authority and the [employer] General Act, this Virtual Visit was conducted with patient's (and/or legal guardian's) consent, to reduce the patient's risk of exposure to COVID-19 and provide necessary medical care. The patient (and/or legal guardian) has also been advised to contact this office for worsening conditions or problems, and seek emergency medical treatment and/or call 911 if deemed necessary.

## 2020-12-29 ENCOUNTER — TELEPHONE (OUTPATIENT)
Dept: CARDIOLOGY CLINIC | Age: 55
End: 2020-12-29

## 2020-12-29 NOTE — TELEPHONE ENCOUNTER
Spoke with pt, relayed message per VSP. Pt says he can not afford the repatha with his insurance he says it is 1600 a month. He would like to know if there is something more affordable he can take?

## 2021-02-22 ENCOUNTER — TELEPHONE (OUTPATIENT)
Dept: INTERNAL MEDICINE CLINIC | Age: 56
End: 2021-02-22

## 2021-02-22 DIAGNOSIS — E78.2 MIXED HYPERLIPIDEMIA: Primary | ICD-10-CM

## 2021-02-22 DIAGNOSIS — I25.10 CORONARY ARTERY DISEASE INVOLVING NATIVE CORONARY ARTERY OF NATIVE HEART WITHOUT ANGINA PECTORIS: ICD-10-CM

## 2021-02-23 NOTE — TELEPHONE ENCOUNTER
Please call patient. Past due for  1. Obtain fasting laboratory studies, ordered in epic, before  2.   Office visit with me soon  Dr. Zhang Gene
Spoke to patient, appointment scheduled and patient notified labs due prior
none

## 2021-02-24 ENCOUNTER — HOSPITAL ENCOUNTER (OUTPATIENT)
Age: 56
Discharge: HOME | End: 2021-02-24
Payer: COMMERCIAL

## 2021-02-24 DIAGNOSIS — E78.2 MIXED HYPERLIPIDEMIA: ICD-10-CM

## 2021-02-24 LAB
A/G RATIO: 1.4 (ref 1.1–2.2)
ALBUMIN SERPL-MCNC: 4.2 G/DL (ref 3.4–5)
ALP BLD-CCNC: 54 U/L (ref 40–129)
ALT SERPL-CCNC: 30 U/L (ref 10–40)
ANION GAP SERPL CALCULATED.3IONS-SCNC: 10 MMOL/L (ref 3–16)
AST SERPL-CCNC: 25 U/L (ref 15–37)
BILIRUB SERPL-MCNC: 0.3 MG/DL (ref 0–1)
BUN BLDV-MCNC: 22 MG/DL (ref 7–20)
CALCIUM SERPL-MCNC: 9.6 MG/DL (ref 8.3–10.6)
CHLORIDE BLD-SCNC: 104 MMOL/L (ref 99–110)
CHOLESTEROL, TOTAL: 196 MG/DL (ref 0–199)
CO2: 27 MMOL/L (ref 21–32)
CREAT SERPL-MCNC: 1.1 MG/DL (ref 0.9–1.3)
GFR AFRICAN AMERICAN: >60
GFR NON-AFRICAN AMERICAN: >60
GLOBULIN: 3 G/DL
GLUCOSE BLD-MCNC: 87 MG/DL (ref 70–99)
HDLC SERPL-MCNC: 43 MG/DL (ref 40–60)
LDL CHOLESTEROL CALCULATED: 141 MG/DL
POTASSIUM SERPL-SCNC: 4.5 MMOL/L (ref 3.5–5.1)
SODIUM BLD-SCNC: 141 MMOL/L (ref 136–145)
TOTAL PROTEIN: 7.2 G/DL (ref 6.4–8.2)
TRIGL SERPL-MCNC: 62 MG/DL (ref 0–150)
VLDLC SERPL CALC-MCNC: 12 MG/DL

## 2021-02-24 PROCEDURE — 36415 COLL VENOUS BLD VENIPUNCTURE: CPT

## 2021-02-24 PROCEDURE — 80061 LIPID PANEL: CPT

## 2021-02-24 PROCEDURE — 80053 COMPREHEN METABOLIC PANEL: CPT

## 2021-03-01 ENCOUNTER — OFFICE VISIT (OUTPATIENT)
Dept: INTERNAL MEDICINE CLINIC | Age: 56
End: 2021-03-01
Payer: COMMERCIAL

## 2021-03-01 VITALS
TEMPERATURE: 97.2 F | HEIGHT: 70 IN | DIASTOLIC BLOOD PRESSURE: 82 MMHG | OXYGEN SATURATION: 98 % | BODY MASS INDEX: 28.49 KG/M2 | SYSTOLIC BLOOD PRESSURE: 134 MMHG | HEART RATE: 66 BPM | WEIGHT: 199 LBS

## 2021-03-01 DIAGNOSIS — K90.0 CELIAC SPRUE: ICD-10-CM

## 2021-03-01 DIAGNOSIS — E78.2 MIXED HYPERLIPIDEMIA: Primary | ICD-10-CM

## 2021-03-01 DIAGNOSIS — G47.30 SLEEP APNEA, UNSPECIFIED TYPE: ICD-10-CM

## 2021-03-01 DIAGNOSIS — K22.719 BARRETT'S ESOPHAGUS WITH DYSPLASIA: ICD-10-CM

## 2021-03-01 DIAGNOSIS — R93.1 ELEVATED CORONARY ARTERY CALCIUM SCORE: ICD-10-CM

## 2021-03-01 DIAGNOSIS — Z12.5 SPECIAL SCREENING FOR MALIGNANT NEOPLASM OF PROSTATE: ICD-10-CM

## 2021-03-01 DIAGNOSIS — R14.0 ABDOMINAL BLOATING: ICD-10-CM

## 2021-03-01 PROCEDURE — 99214 OFFICE O/P EST MOD 30 MIN: CPT | Performed by: INTERNAL MEDICINE

## 2021-03-01 RX ORDER — EZETIMIBE 10 MG/1
10 TABLET ORAL DAILY
Qty: 90 TABLET | Refills: 1 | Status: SHIPPED | OUTPATIENT
Start: 2021-03-01 | End: 2021-09-02 | Stop reason: SDUPTHER

## 2021-03-01 RX ORDER — OMEPRAZOLE 20 MG/1
20 CAPSULE, DELAYED RELEASE ORAL DAILY
COMMUNITY

## 2021-03-01 SDOH — ECONOMIC STABILITY: FOOD INSECURITY: WITHIN THE PAST 12 MONTHS, THE FOOD YOU BOUGHT JUST DIDN'T LAST AND YOU DIDN'T HAVE MONEY TO GET MORE.: NEVER TRUE

## 2021-03-01 SDOH — ECONOMIC STABILITY: FOOD INSECURITY: WITHIN THE PAST 12 MONTHS, YOU WORRIED THAT YOUR FOOD WOULD RUN OUT BEFORE YOU GOT MONEY TO BUY MORE.: NEVER TRUE

## 2021-03-01 SDOH — ECONOMIC STABILITY: TRANSPORTATION INSECURITY
IN THE PAST 12 MONTHS, HAS LACK OF TRANSPORTATION KEPT YOU FROM MEETINGS, WORK, OR FROM GETTING THINGS NEEDED FOR DAILY LIVING?: NOT ASKED

## 2021-03-01 ASSESSMENT — PATIENT HEALTH QUESTIONNAIRE - PHQ9
SUM OF ALL RESPONSES TO PHQ QUESTIONS 1-9: 0
1. LITTLE INTEREST OR PLEASURE IN DOING THINGS: 0
SUM OF ALL RESPONSES TO PHQ9 QUESTIONS 1 & 2: 0
SUM OF ALL RESPONSES TO PHQ QUESTIONS 1-9: 0

## 2021-03-02 NOTE — PROGRESS NOTES
Brenda Falcon (:  1965) is a 60 y.o. male,Established patient, here for evaluation of the following chief complaint(s):  Hyperlipidemia      ASSESSMENT/PLAN:  1. Mixed hyperlipidemia  -Lipids are more elevated. Will start Zetia. New prescription. Ezetimibe (ZETIA) 10 MG tablet; Take 1 tablet by mouth daily, Disp-90 tablet, R-1For high cholesterolNormal  -     Lipid Panel; Future  -     Comprehensive Metabolic Panel; Future    2. Harry's esophagus with dysplasia            Reflux symptoms better on Prilosec. He recently stop eating yogurt and observe stomach bloating symptoms. Also trial of the FODMAP diet sheet given to him. 3. Celiac sprue           Continue with his baseline dietary restrictions    4. Elevated coronary artery calcium score          Cardiology has reordered testing. 5. Sleep apnea, unspecified type            Baseline. Continue present treatment    6. Abdominal bloating            Stop yogurt. Trial of Fodmap diet    7. Special screening for malignant neoplasm of prostate  -   Health maintenance. PSA screening; Future    8. Health maintenance:           Has been contacted to obtain colonoscopy by GI and encouraged to do so    Return in about 6 months (around 2021) for LIPIDS   . SUBJECTIVE/OBJECTIVE:  HPI  Patient with PRAVIN, periodic limb movements, celiac sprue, hyperlipidemia, obesity, Harry's esophagus with dysplasia, elevated coronary artery calcium score. Last office visit with me: 2020: V V complains of urinary frequency treated with Flomax. Health maintenance: HIV, shingles. Labs: 2021: Chemistry panel: BUN: 22.  Lipid panel: Total cholesterol: 196. LDL cholesterol: 141. This compares laboratory data : Total cholesterol: 177. LDL cholesterol: 121.  2020 V V cardiology Dr. Michelle Guidry. Repatha and Pralulent not options secondary to cost.  Elevated blood pressure noted. Treated with starting Norvasc at 5 mg once a day.   Discussed consider Zetia ordered CT cardiac calcium scoring patient states his not obtained yet to date. Reflux symptoms better with Prilosec. Still has some stomach bloating but does eat yogurt on a regular basis which I encouraged him to stop and observe his symptoms. Review of Systems    Review of Systems   Constitutional: negative   HENT: negative   EYES: negative   Respiratory: negative   Gastrointestinal: GERD. Abdominal bloating. Endocrine: negative   Musculoskeletal: negative   Skin: negative   Allergic/Immunological: negative   Hematological: negative   Psychiatric/Behavorial: negative   CV: Lipids are more elevated. CNS: negative   :Negative   S/E:Negative  Renal: Negative      Physical Exam    Head/neck: Ears: Normal TM. No obstruction. Throat: Mask. Not examined. Thyroid is nonpalpable. Neck: No lymphadenopathy. Eyes: EOMI, PERRLA with no nystagmus  Lungs: Clear to auscultation. CV: S1-S2 normal.   VANITA murmur. Carotid: No bruit. Abdominal Examination: Bowel sounds present. Soft nontender. No mass no guarding or   rebound. Spine/extremities: No edema. No tenderness to palpation. Skin: No rash  CNS: Patient is alert, cooperative, moves all 4 limbs, ambulates without difficulty, light touch normal.   .  Good orientation. Blood pressure 134/82, pulse 66, temperature 97.2 °F (36.2 °C), temperature source Infrared, height 5' 10\" (1.778 m), weight 199 lb (90.3 kg), SpO2 98 %. An electronic signature was used to authenticate this note.     --Mago Dickens MD

## 2021-03-15 ENCOUNTER — IMMUNIZATION (OUTPATIENT)
Dept: PRIMARY CARE CLINIC | Age: 56
End: 2021-03-15
Payer: COMMERCIAL

## 2021-03-15 PROCEDURE — 0001A COVID-19, PFIZER VACCINE 30MCG/0.3ML DOSE: CPT | Performed by: FAMILY MEDICINE

## 2021-03-15 PROCEDURE — 91300 COVID-19, PFIZER VACCINE 30MCG/0.3ML DOSE: CPT | Performed by: FAMILY MEDICINE

## 2021-04-12 ENCOUNTER — IMMUNIZATION (OUTPATIENT)
Dept: PRIMARY CARE CLINIC | Age: 56
End: 2021-04-12
Payer: COMMERCIAL

## 2021-04-12 PROCEDURE — 0002A COVID-19, PFIZER VACCINE 30MCG/0.3ML DOSE: CPT | Performed by: FAMILY MEDICINE

## 2021-04-12 PROCEDURE — 91300 COVID-19, PFIZER VACCINE 30MCG/0.3ML DOSE: CPT | Performed by: FAMILY MEDICINE

## 2021-06-30 ENCOUNTER — TELEPHONE (OUTPATIENT)
Dept: CARDIOLOGY CLINIC | Age: 56
End: 2021-06-30

## 2021-06-30 RX ORDER — ROSUVASTATIN CALCIUM 40 MG/1
TABLET, COATED ORAL
Qty: 90 TABLET | Refills: 3 | Status: SHIPPED | OUTPATIENT
Start: 2021-06-30 | End: 2021-09-02 | Stop reason: SDUPTHER

## 2021-08-31 ENCOUNTER — TELEPHONE (OUTPATIENT)
Dept: INTERNAL MEDICINE CLINIC | Age: 56
End: 2021-08-31

## 2021-09-01 ENCOUNTER — HOSPITAL ENCOUNTER (OUTPATIENT)
Age: 56
Discharge: HOME | End: 2021-09-01
Payer: COMMERCIAL

## 2021-09-01 PROCEDURE — 84153 ASSAY OF PSA TOTAL: CPT

## 2021-09-01 PROCEDURE — 80061 LIPID PANEL: CPT

## 2021-09-01 PROCEDURE — 80053 COMPREHEN METABOLIC PANEL: CPT

## 2021-09-01 PROCEDURE — 36415 COLL VENOUS BLD VENIPUNCTURE: CPT

## 2021-09-01 NOTE — PROGRESS NOTES
Alan Medina 60 y.o. male presents today for an Established patient for   Chief Complaint   Patient presents with    6 Month Follow-Up     follow up            ASSESSMENT/PLAN    1. Coronary artery disease involving native coronary artery of native heart without angina pectoris               Borderline elevated lipids. Patient did not take Zetia. Discussed need to take Zetia and Crestor and repeat fasting labs  - rosuvastatin (CRESTOR) 40 MG tablet; TAKE ONE TABLET BY MOUTH DAILY FOR HIGH CHOLESTEROL  Dispense: 90 tablet; Refill: 1  - Comprehensive Metabolic Panel; Future    2. Elevated lipids            Borderline elevated lipids but off Zetia. Restart Zetia. Obtain fasting laboratory study before next office visit  - Lipid Panel; Future    3. Sleep apnea, unspecified type               Stable continue to monitor    4. Celiac sprue                  Stable continue to monitor    5. Harry's esophagus with dysplasia              EGD 4/23/2021 stricture at GE junction transverse by scope. No evidence of Harry's per Dr. Magdaleno Freedman    6. Family history of early CAD              Noted    9. Periodic limb movement                  Stable    8. Screening for viral disease                    Health maintenance  - HIV Screen; Future    9. Mixed hyperlipidemia                Start Zetia. New prescription emailed  - ezetimibe (ZETIA) 10 MG tablet; Take 1 tablet by mouth daily  Dispense: 90 tablet; Refill: 1       Return in about 6 months (around 3/2/2022) for LIPIDS   CAD  GERD  PRAVIN. HPI:     Patient with PRAVIN, periodic limb movements, positive family history for coronary artery disease, celiac sprue, hyperlipidemia, Harry's esophagus with dysplasia, elevated coronary artery calcium score, hyperlipidemia, coronary artery disease. Review last office visit 3/1/2021: Elevated lipids. Added Zetia. Harry's esophagus with dysplasia on Prilosec.   Celiac sprue, elevated coronary artery calcium score followed by cardiology. PRAVIN. Abdominal bloating he was instructed to stop yogurt trial of FODMAP diet. Upcoming colonoscopy. Health maintenance: HIV, shingles. Labs 9/1/2021 chemistry panel: Unremarkable. Lipid panel: Total cholesterol: 209. LDL cholesterol: 140. PSA 1.25. Upon review of medicines. He was prescribed Zetia but did not take it. Reviewed colonoscopy 3/23/2021 2 polyps, diverticulosis, internal hemorrhoids. Redo 5 years. 3/26/2021 pathology report hyperplastic polyps. 4/23/2021 underwent EGD by Dr. Romero Cordero. Stricture at GE junction the scope transverse the lesion. No evidence of Harry's. I do not have a pathology report. He notes a skin lesion. Referral to dermatology. Results for orders placed or performed during the hospital encounter of 09/01/21   PSA screening   Result Value Ref Range    PSA 1.25 0.00 - 4.00 ng/mL   Comprehensive Metabolic Panel   Result Value Ref Range    Sodium 141 136 - 145 mmol/L    Potassium 4.4 3.5 - 5.1 mmol/L    Chloride 102 99 - 110 mmol/L    CO2 23 21 - 32 mmol/L    Anion Gap 16 3 - 16    Glucose 87 70 - 99 mg/dL    BUN 18 7 - 20 mg/dL    CREATININE 1.0 0.9 - 1.3 mg/dL    GFR Non-African American >60 >60    GFR African American >60 >60    Calcium 9.4 8.3 - 10.6 mg/dL    Total Protein 7.5 6.4 - 8.2 g/dL    Albumin 4.8 3.4 - 5.0 g/dL    Albumin/Globulin Ratio 1.8 1.1 - 2.2    Total Bilirubin 0.5 0.0 - 1.0 mg/dL    Alkaline Phosphatase 58 40 - 129 U/L    ALT 18 10 - 40 U/L    AST 23 15 - 37 U/L    Globulin 2.7 g/dL   Lipid Panel   Result Value Ref Range    Cholesterol, Total 209 (H) 0 - 199 mg/dL    Triglycerides 47 0 - 150 mg/dL    HDL 60 40 - 60 mg/dL    LDL Calculated 140 (H) <100 mg/dL    VLDL Cholesterol Calculated 9 Not Established mg/dL              ROS:  Review of Systems   Constitutional: negative   HENT: negative   EYES: negative   Respiratory: negative   Gastrointestinal: Colonoscopy with hyperplastic polyps. Redo 5 years.   EGD with stricture at [address] [place], [place] [address]Pepscan junction transverse by the scope. No evidence of Harry's. No pathology report  Endocrine: negative   Musculoskeletal: negative   Skin: negative   Allergic/Immunological: negative   Hematological: negative   Psychiatric/Behavorial: negative   CV: Borderline elevated lipids. He did not take   Zetia. No complaint of chest pain or shortness of breath. CNS: negative   :Negative   S/E:Negative  Renal: Negative     Physical Exam:  Head/neck: Ears: Normal TM. No obstruction. Throat: Mask. Not examined. Thyroid not palpable. .  Neck: No lymphadenopathy. Eyes: EOMI, PERRLA with no nystagmus  Lungs: Clear to auscultation. CV: S1-S2 normal.   VANITA murmur. Carotid: No bruit. Abdominal Examination: Bowel sounds present. Soft nontender. No mass no guarding or   rebound. Spine/extremities: No edema. No tenderness to palpation. Skin: No rash  CNS: Patient is alert, cooperative, moves all 4 limbs, ambulates without difficulty, light touch normal.   Good historian. Good orientation. Blood pressure 130/82, pulse 69, temperature 97.4 °F (36.3 °C), height 5' 10\" (1.778 m), weight 198 lb (89.8 kg), SpO2 98 %.          Aleisha Nieto MD

## 2021-09-01 NOTE — TELEPHONE ENCOUNTER
Please call patient ASAP. Needs to obtain fasting laboratory studies, ordered in epic before office visit on Thursday. Need 8 hours of fasting. If he ate breakfast Wednesday morning could still skip lunch and obtain fasting labs later in the afternoon up to 6 PM at the main hospital entrance lobby.   Still see me in the office even if is unable to obtain labs before office visit  Dr. Isael Campos

## 2021-09-02 ENCOUNTER — OFFICE VISIT (OUTPATIENT)
Dept: INTERNAL MEDICINE CLINIC | Age: 56
End: 2021-09-02
Payer: COMMERCIAL

## 2021-09-02 VITALS
OXYGEN SATURATION: 98 % | DIASTOLIC BLOOD PRESSURE: 82 MMHG | HEIGHT: 70 IN | HEART RATE: 69 BPM | BODY MASS INDEX: 28.35 KG/M2 | SYSTOLIC BLOOD PRESSURE: 130 MMHG | WEIGHT: 198 LBS | TEMPERATURE: 97.4 F

## 2021-09-02 DIAGNOSIS — G47.61 PERIODIC LIMB MOVEMENT: ICD-10-CM

## 2021-09-02 DIAGNOSIS — Z82.49 FAMILY HISTORY OF EARLY CAD: ICD-10-CM

## 2021-09-02 DIAGNOSIS — K22.719 BARRETT'S ESOPHAGUS WITH DYSPLASIA: ICD-10-CM

## 2021-09-02 DIAGNOSIS — Z11.59 SCREENING FOR VIRAL DISEASE: ICD-10-CM

## 2021-09-02 DIAGNOSIS — I25.10 CORONARY ARTERY DISEASE INVOLVING NATIVE CORONARY ARTERY OF NATIVE HEART WITHOUT ANGINA PECTORIS: Primary | ICD-10-CM

## 2021-09-02 DIAGNOSIS — G47.30 SLEEP APNEA, UNSPECIFIED TYPE: ICD-10-CM

## 2021-09-02 DIAGNOSIS — E78.5 ELEVATED LIPIDS: ICD-10-CM

## 2021-09-02 DIAGNOSIS — K90.0 CELIAC SPRUE: ICD-10-CM

## 2021-09-02 DIAGNOSIS — E78.2 MIXED HYPERLIPIDEMIA: ICD-10-CM

## 2021-09-02 LAB
A/G RATIO: 1.8 (ref 1.1–2.2)
ALBUMIN SERPL-MCNC: 4.8 G/DL (ref 3.4–5)
ALP BLD-CCNC: 58 U/L (ref 40–129)
ALT SERPL-CCNC: 18 U/L (ref 10–40)
ANION GAP SERPL CALCULATED.3IONS-SCNC: 16 MMOL/L (ref 3–16)
AST SERPL-CCNC: 23 U/L (ref 15–37)
BILIRUB SERPL-MCNC: 0.5 MG/DL (ref 0–1)
BUN BLDV-MCNC: 18 MG/DL (ref 7–20)
CALCIUM SERPL-MCNC: 9.4 MG/DL (ref 8.3–10.6)
CHLORIDE BLD-SCNC: 102 MMOL/L (ref 99–110)
CHOLESTEROL, TOTAL: 209 MG/DL (ref 0–199)
CO2: 23 MMOL/L (ref 21–32)
CREAT SERPL-MCNC: 1 MG/DL (ref 0.9–1.3)
GFR AFRICAN AMERICAN: >60
GFR NON-AFRICAN AMERICAN: >60
GLOBULIN: 2.7 G/DL
GLUCOSE BLD-MCNC: 87 MG/DL (ref 70–99)
HDLC SERPL-MCNC: 60 MG/DL (ref 40–60)
LDL CHOLESTEROL CALCULATED: 140 MG/DL
POTASSIUM SERPL-SCNC: 4.4 MMOL/L (ref 3.5–5.1)
PROSTATE SPECIFIC ANTIGEN: 1.25 NG/ML (ref 0–4)
SODIUM BLD-SCNC: 141 MMOL/L (ref 136–145)
TOTAL PROTEIN: 7.5 G/DL (ref 6.4–8.2)
TRIGL SERPL-MCNC: 47 MG/DL (ref 0–150)
VLDLC SERPL CALC-MCNC: 9 MG/DL

## 2021-09-02 PROCEDURE — 99214 OFFICE O/P EST MOD 30 MIN: CPT | Performed by: INTERNAL MEDICINE

## 2021-09-02 RX ORDER — EZETIMIBE 10 MG/1
10 TABLET ORAL DAILY
Qty: 90 TABLET | Refills: 1 | Status: SHIPPED | OUTPATIENT
Start: 2021-09-02 | End: 2022-09-19

## 2021-09-02 RX ORDER — ROSUVASTATIN CALCIUM 40 MG/1
TABLET, COATED ORAL
Qty: 90 TABLET | Refills: 1 | Status: SHIPPED | OUTPATIENT
Start: 2021-09-02 | End: 2022-09-19 | Stop reason: SDUPTHER

## 2021-10-27 ENCOUNTER — OFFICE VISIT (OUTPATIENT)
Dept: INTERNAL MEDICINE CLINIC | Age: 56
End: 2021-10-27
Payer: COMMERCIAL

## 2021-10-27 VITALS
BODY MASS INDEX: 28.27 KG/M2 | SYSTOLIC BLOOD PRESSURE: 138 MMHG | TEMPERATURE: 97.2 F | OXYGEN SATURATION: 97 % | HEART RATE: 69 BPM | WEIGHT: 197 LBS | DIASTOLIC BLOOD PRESSURE: 78 MMHG

## 2021-10-27 DIAGNOSIS — M79.604 RIGHT LEG PAIN: Primary | ICD-10-CM

## 2021-10-27 PROCEDURE — 99213 OFFICE O/P EST LOW 20 MIN: CPT | Performed by: NURSE PRACTITIONER

## 2021-10-27 PROCEDURE — 90471 IMMUNIZATION ADMIN: CPT | Performed by: NURSE PRACTITIONER

## 2021-10-27 PROCEDURE — 90688 IIV4 VACCINE SPLT 0.5 ML IM: CPT | Performed by: NURSE PRACTITIONER

## 2021-10-27 RX ORDER — NABUMETONE 750 MG/1
750 TABLET, FILM COATED ORAL 2 TIMES DAILY
Qty: 60 TABLET | Refills: 3 | Status: SHIPPED | OUTPATIENT
Start: 2021-10-27 | End: 2022-10-18

## 2021-10-27 ASSESSMENT — ENCOUNTER SYMPTOMS
ABDOMINAL PAIN: 0
VOMITING: 0
BLOOD IN STOOL: 0
EYE DISCHARGE: 0
SHORTNESS OF BREATH: 0
CONSTIPATION: 0
WHEEZING: 0
COUGH: 0
NAUSEA: 0
DIARRHEA: 0

## 2021-10-27 NOTE — PROGRESS NOTES
IMM  10/27/21    Vivienne Henriquez  60 y.o.  male      Chief Complaint   Patient presents with    Leg Pain     throbbing pain from hip all the way down the leg, 1 week, Pt has tried advil with no relief         HPI:   Pt presents with complaint of right leg pain over the past week that started initially about the right hip and now seems to radiate down to shin. It is pain that improves with activity, seems to be worse with initially getting up from a seated position. At first was not a problem if lying down but he felt it interfered with his rest last night. He has taken advil with minimal relief. Pain is rated 5/10, and described as dull throbbing, with mild tingling. Denies any numbness, denies any back pain. Denies any know contributing factors. Denies any change in bladder or bowels. Lab Results   Component Value Date    CHOL 209 (H) 09/01/2021    CHOL 196 02/24/2021    CHOL 177 06/22/2020     Lab Results   Component Value Date    TRIG 47 09/01/2021    TRIG 62 02/24/2021    TRIG 75 06/22/2020     Lab Results   Component Value Date    HDL 60 09/01/2021    HDL 43 02/24/2021    HDL 41 06/22/2020     Lab Results   Component Value Date    LDLCALC 140 (H) 09/01/2021    LDLCALC 141 (H) 02/24/2021    LDLCALC 121 (H) 06/22/2020     Lab Results   Component Value Date    LABVLDL 9 09/01/2021    LABVLDL 12 02/24/2021    LABVLDL 15 06/22/2020     No results found for: CHOLHDLRATIO     Lab Results   Component Value Date     09/01/2021    K 4.4 09/01/2021     09/01/2021    CO2 23 09/01/2021    BUN 18 09/01/2021    CREATININE 1.0 09/01/2021    GLUCOSE 87 09/01/2021    CALCIUM 9.4 09/01/2021    PROT 7.5 09/01/2021    LABALBU 4.8 09/01/2021    BILITOT 0.5 09/01/2021    ALKPHOS 58 09/01/2021    AST 23 09/01/2021    ALT 18 09/01/2021    LABGLOM >60 09/01/2021    GFRAA >60 09/01/2021    AGRATIO 1.8 09/01/2021    GLOB 2.7 09/01/2021       No results found for: LABA1C  No results found for: EAG       1.  Right leg pain  Reviewed possible etiologies for current discomfort  Will trial relafen, bid, PRN  Instructed to take with food to minimize GI disturbance  If pain persists or progresses return to see Dr. Phu Barraza; may need imaging and/or ortho referral  He wishes to only use the med at this time and will FU as needed    Flu shot given today      /78   Pulse 69   Temp 97.2 °F (36.2 °C) (Temporal)   Wt 197 lb (89.4 kg)   SpO2 97%   BMI 28.27 kg/m²     Current Outpatient Medications   Medication Sig Dispense Refill    nabumetone (RELAFEN) 750 MG tablet Take 1 tablet by mouth 2 times daily 60 tablet 3    rosuvastatin (CRESTOR) 40 MG tablet TAKE ONE TABLET BY MOUTH DAILY FOR HIGH CHOLESTEROL 90 tablet 1    ezetimibe (ZETIA) 10 MG tablet Take 1 tablet by mouth daily 90 tablet 1    omeprazole (PRILOSEC) 20 MG delayed release capsule Take 20 mg by mouth daily      tamsulosin (FLOMAX) 0.4 MG capsule TAKE ONE CAPSULE BY MOUTH ONCE NIGHTLY HALF AN HOUR AFTER EVENING MEAL 30 capsule 1    amLODIPine (NORVASC) 5 MG tablet Take 1 tablet by mouth daily 30 tablet 11    aspirin 81 MG tablet Take 81 mg by mouth daily. No current facility-administered medications for this visit. Social History     Socioeconomic History    Marital status: Single     Spouse name: Not on file    Number of children: Not on file    Years of education: Not on file    Highest education level: Not on file   Occupational History    Not on file   Tobacco Use    Smoking status: Never Smoker    Smokeless tobacco: Never Used   Vaping Use    Vaping Use: Never used   Substance and Sexual Activity    Alcohol use:  Yes     Alcohol/week: 7.0 standard drinks     Types: 7 Glasses of wine per week     Comment: 1 / day    Drug use: No    Sexual activity: Yes     Partners: Female   Other Topics Concern    Not on file   Social History Narrative    Not on file     Social Determinants of Health     Financial Resource Strain: Low Risk     Difficulty of Paying Living Expenses: Not hard at all   Food Insecurity: No Food Insecurity    Worried About [address] [employer] in the Last Year: Never true    Ran Out of Food in the Last Year: Never true   Transportation Needs:     Lack of Transportation (Medical):  Lack of Transportation (Non-Medical):    Physical Activity:     Days of Exercise per Week:     Minutes of Exercise per Session:    Stress:     Feeling of Stress :    Social Connections:     Frequency of Communication with Friends and Family:     Frequency of Social Gatherings with Friends and Family:     Attends Protestant Services:     Active Member of Clubs or Organizations:     Attends Club or Organization Meetings:     Marital Status:    Intimate Partner Violence:     Fear of Current or Ex-Partner:     Emotionally Abused:     Physically Abused:     Sexually Abused:        Family History   Problem Relation Age of Onset    Cancer Mother 58        colon    High Cholesterol Father     Heart Disease Father 54        MI    High Cholesterol Sister        Past Medical History:   Diagnosis Date    Allergic     Anemia before 2003    Harry's esophagus 2003    Celiac sprue 2003    Family history of early CAD     Father Death 54 MI    Hyperlipidemia     Obesity 3/18/2015    Seasonal allergies     Unspecified sleep apnea        Review of Systems   Constitutional: Negative for fever. HENT: Negative for congestion. Eyes: Negative for discharge. Respiratory: Negative for cough, shortness of breath and wheezing. Cardiovascular: Negative for chest pain, palpitations and leg swelling. Gastrointestinal: Negative for abdominal pain, blood in stool, constipation, diarrhea, nausea and vomiting. Genitourinary: Negative for dysuria and hematuria. Musculoskeletal: Negative for myalgias. See HPI   Skin: Negative for rash. Neurological: Negative for dizziness. Psychiatric/Behavioral: The patient is not nervous/anxious. Physical Exam  Constitutional:       General: He is not in acute distress. Appearance: He is not diaphoretic. HENT:      Right Ear: External ear normal.      Left Ear: External ear normal.      Mouth/Throat:      Pharynx: No oropharyngeal exudate. Eyes:      General: No scleral icterus. Right eye: No discharge. Left eye: No discharge. Neck:      Thyroid: No thyromegaly. Cardiovascular:      Rate and Rhythm: Normal rate and regular rhythm. Heart sounds: Normal heart sounds. No murmur heard. No friction rub. No gallop. Pulmonary:      Effort: Pulmonary effort is normal.      Breath sounds: Normal breath sounds. No wheezing. Abdominal:      General: Bowel sounds are normal. There is no distension. Palpations: Abdomen is soft. Tenderness: There is no abdominal tenderness. Genitourinary:     Comments: No inguinal swelling, bulge or ttp  Musculoskeletal:         General: No tenderness. Normal range of motion. Cervical back: Normal range of motion. Comments: Are of pain is identified about hip joint and lateral femur. Lymphadenopathy:      Cervical: No cervical adenopathy. Skin:     General: Skin is warm and dry. Findings: No erythema or rash. Neurological:      Mental Status: He is alert and oriented to person, place, and time.    Psychiatric:         Judgment: Judgment normal.                Mily Rodriguez, APRN - CNP

## 2022-03-04 ENCOUNTER — TELEPHONE (OUTPATIENT)
Dept: INTERNAL MEDICINE CLINIC | Age: 57
End: 2022-03-04

## 2022-03-04 NOTE — TELEPHONE ENCOUNTER
Please call patient. Order for fasting laboratory studies in epic to be done before office visit on Monday. Tell patient about where to get labs through [address] i.e. Saturday morning 8 AM until 12 noon Lonny matson.   24 hours a day can always get labs at the ER  Dr. Jayshree Lan

## 2022-03-06 NOTE — PROGRESS NOTES
Kanwal Spring 60 y.o. male presents today for an Established patient for   Chief Complaint   Patient presents with    Hyperlipidemia    Gastroesophageal Reflux            ASSESSMENT/PLAN    1. Coronary artery disease involving native coronary artery of native heart without angina pectoris                 laboratory studies not available at office visit. States he has been taking his Zetia and Crestor.  - Lipid Panel; Future  - Comprehensive Metabolic Panel; Future    2. Elevated lipids               he is to call for lab results    3. Sleep apnea, unspecified type               baseline. 4. Celiac sprue                             baseline. 5. Harry's esophagus with dysplasia                      discussed dietary GI restriction sheet    6. Periodic limb movement                  stable at this time    7. Special screening for malignant neoplasm of prostate                        health maintenance  - PSA Screening; Future      8. HTN                 start Norvasc 5 mg take 1/2 pill a day. HPI:     Review last labs: 9/2/2021 patient with CAD admitted to poor compliance with Zetia but was taking Crestor. Lipids were borderline elevated. We discussed the need for better compliance. Hyperlipidemia, PRAVIN, celiac sprue, Harry's esophagus with dysphagia EGD negative Harry's Dr. Sommer Frias 4/23/2021, family history for coronary artery disease, periodic limb movements  Labs: Results not available at office visit  Reviewed meds. He relates concerning Norvasc he never took the medicine. He relates blood pressure readings at home systolic 853-025 and diastolic 83-82.     Results for orders placed or performed during the hospital encounter of 03/07/22   HIV Screen   Result Value Ref Range    HIV Ag/Ab Non-Reactive Non-reactive    HIV-1 Antibody Non-Reactive Non-reactive    HIV ANTIGEN Non-Reactive Non-reactive    HIV-2 Ab Non-Reactive Non-reactive   Comprehensive Metabolic Panel   Result Value Ref Range Sodium 139 136 - 145 mmol/L    Potassium 4.7 3.5 - 5.1 mmol/L    Chloride 103 99 - 110 mmol/L    CO2 23 21 - 32 mmol/L    Anion Gap 13 3 - 16    Glucose 90 70 - 99 mg/dL    BUN 15 7 - 20 mg/dL    CREATININE 0.9 0.9 - 1.3 mg/dL    GFR Non-African American >60 >60    GFR African American >60 >60    Calcium 9.1 8.3 - 10.6 mg/dL    Total Protein 6.5 6.4 - 8.2 g/dL    Albumin 4.4 3.4 - 5.0 g/dL    Albumin/Globulin Ratio 2.1 1.1 - 2.2    Total Bilirubin 0.4 0.0 - 1.0 mg/dL    Alkaline Phosphatase 47 40 - 129 U/L    ALT 16 10 - 40 U/L    AST 20 15 - 37 U/L   Lipid Panel   Result Value Ref Range    Cholesterol, Total 232 (H) 0 - 199 mg/dL    Triglycerides 84 0 - 150 mg/dL    HDL 52 40 - 60 mg/dL    LDL Calculated 163 (H) <100 mg/dL    VLDL Cholesterol Calculated 17 Not Established mg/dL              ROS:  Review of Systems   Constitutional: negative   HENT: negative   EYES: negative   Respiratory: negative   Gastrointestinal: negative   Endocrine: negative   Musculoskeletal: negative   Skin: negative   Allergic/Immunological: negative   Hematological: negative   Psychiatric/Behavorial: negative   CV: Coronary artery disease but not reaching cholesterol targets. Elevated blood pressure readings off Norvasc  CNS: negative   :Negative   S/E:Negative  Renal: Negative     Physical Exam:  Head/neck: Ears: Normal TM. No obstruction. Throat: Mask. Not examined. Thyroids not palpable. Neck: No lymphadenopathy. Eyes: EOMI, PERRLA with no nystagmus  Lungs: Clear to auscultation. CV: S1-S2 normal.  No murmur. Carotid: No bruit. Abdominal Examination: Bowel sounds present. Soft nontender. No mass no guarding or   rebound. Spine/extremities: No edema. No tenderness to palpation. Skin: No rash  CNS: Patient is alert, cooperative, moves all 4 limbs, ambulates without difficulty, light touch normal.  Good historian. Good orientation.    Blood pressure (!) 142/80, pulse 67, temperature 97.5 °F (36.4 °C), weight 197 lb (89.4 kg), SpO2 97 %.          Madelyn Regan MD

## 2022-03-07 ENCOUNTER — OFFICE VISIT (OUTPATIENT)
Dept: INTERNAL MEDICINE CLINIC | Age: 57
End: 2022-03-07
Payer: COMMERCIAL

## 2022-03-07 ENCOUNTER — HOSPITAL ENCOUNTER (OUTPATIENT)
Age: 57
Discharge: HOME | End: 2022-03-07
Payer: COMMERCIAL

## 2022-03-07 VITALS
TEMPERATURE: 97.5 F | OXYGEN SATURATION: 97 % | SYSTOLIC BLOOD PRESSURE: 142 MMHG | WEIGHT: 197 LBS | HEART RATE: 67 BPM | BODY MASS INDEX: 28.27 KG/M2 | DIASTOLIC BLOOD PRESSURE: 80 MMHG

## 2022-03-07 DIAGNOSIS — Z11.59 SCREENING FOR VIRAL DISEASE: ICD-10-CM

## 2022-03-07 DIAGNOSIS — K22.719 BARRETT'S ESOPHAGUS WITH DYSPLASIA: ICD-10-CM

## 2022-03-07 DIAGNOSIS — I10 PRIMARY HYPERTENSION: ICD-10-CM

## 2022-03-07 DIAGNOSIS — G47.61 PERIODIC LIMB MOVEMENT: ICD-10-CM

## 2022-03-07 DIAGNOSIS — G47.30 SLEEP APNEA, UNSPECIFIED TYPE: ICD-10-CM

## 2022-03-07 DIAGNOSIS — I25.10 CORONARY ARTERY DISEASE INVOLVING NATIVE CORONARY ARTERY OF NATIVE HEART WITHOUT ANGINA PECTORIS: ICD-10-CM

## 2022-03-07 DIAGNOSIS — I25.10 CORONARY ARTERY DISEASE INVOLVING NATIVE CORONARY ARTERY OF NATIVE HEART WITHOUT ANGINA PECTORIS: Primary | ICD-10-CM

## 2022-03-07 DIAGNOSIS — E78.5 ELEVATED LIPIDS: ICD-10-CM

## 2022-03-07 DIAGNOSIS — Z12.5 SPECIAL SCREENING FOR MALIGNANT NEOPLASM OF PROSTATE: ICD-10-CM

## 2022-03-07 DIAGNOSIS — K90.0 CELIAC SPRUE: ICD-10-CM

## 2022-03-07 LAB
A/G RATIO: 2.1 (ref 1.1–2.2)
ALBUMIN SERPL-MCNC: 4.4 G/DL (ref 3.4–5)
ALP BLD-CCNC: 47 U/L (ref 40–129)
ALT SERPL-CCNC: 16 U/L (ref 10–40)
ANION GAP SERPL CALCULATED.3IONS-SCNC: 13 MMOL/L (ref 3–16)
AST SERPL-CCNC: 20 U/L (ref 15–37)
BILIRUB SERPL-MCNC: 0.4 MG/DL (ref 0–1)
BUN BLDV-MCNC: 15 MG/DL (ref 7–20)
CALCIUM SERPL-MCNC: 9.1 MG/DL (ref 8.3–10.6)
CHLORIDE BLD-SCNC: 103 MMOL/L (ref 99–110)
CHOLESTEROL, TOTAL: 232 MG/DL (ref 0–199)
CO2: 23 MMOL/L (ref 21–32)
CREAT SERPL-MCNC: 0.9 MG/DL (ref 0.9–1.3)
GFR AFRICAN AMERICAN: >60
GFR NON-AFRICAN AMERICAN: >60
GLUCOSE BLD-MCNC: 90 MG/DL (ref 70–99)
HDLC SERPL-MCNC: 52 MG/DL (ref 40–60)
LDL CHOLESTEROL CALCULATED: 163 MG/DL
POTASSIUM SERPL-SCNC: 4.7 MMOL/L (ref 3.5–5.1)
SODIUM BLD-SCNC: 139 MMOL/L (ref 136–145)
TOTAL PROTEIN: 6.5 G/DL (ref 6.4–8.2)
TRIGL SERPL-MCNC: 84 MG/DL (ref 0–150)
VLDLC SERPL CALC-MCNC: 17 MG/DL

## 2022-03-07 PROCEDURE — 99214 OFFICE O/P EST MOD 30 MIN: CPT | Performed by: INTERNAL MEDICINE

## 2022-03-07 PROCEDURE — 80053 COMPREHEN METABOLIC PANEL: CPT

## 2022-03-07 PROCEDURE — 87390 HIV-1 AG IA: CPT

## 2022-03-07 PROCEDURE — 86701 HIV-1ANTIBODY: CPT

## 2022-03-07 PROCEDURE — 80061 LIPID PANEL: CPT

## 2022-03-07 PROCEDURE — 86702 HIV-2 ANTIBODY: CPT

## 2022-03-07 PROCEDURE — 36415 COLL VENOUS BLD VENIPUNCTURE: CPT

## 2022-03-07 SDOH — ECONOMIC STABILITY: FOOD INSECURITY: WITHIN THE PAST 12 MONTHS, YOU WORRIED THAT YOUR FOOD WOULD RUN OUT BEFORE YOU GOT MONEY TO BUY MORE.: NEVER TRUE

## 2022-03-07 SDOH — ECONOMIC STABILITY: FOOD INSECURITY: WITHIN THE PAST 12 MONTHS, THE FOOD YOU BOUGHT JUST DIDN'T LAST AND YOU DIDN'T HAVE MONEY TO GET MORE.: NEVER TRUE

## 2022-03-07 ASSESSMENT — PATIENT HEALTH QUESTIONNAIRE - PHQ9
1. LITTLE INTEREST OR PLEASURE IN DOING THINGS: 0
SUM OF ALL RESPONSES TO PHQ QUESTIONS 1-9: 0
SUM OF ALL RESPONSES TO PHQ QUESTIONS 1-9: 0
2. FEELING DOWN, DEPRESSED OR HOPELESS: 0
SUM OF ALL RESPONSES TO PHQ QUESTIONS 1-9: 0
SUM OF ALL RESPONSES TO PHQ QUESTIONS 1-9: 0
SUM OF ALL RESPONSES TO PHQ9 QUESTIONS 1 & 2: 0

## 2022-03-07 ASSESSMENT — SOCIAL DETERMINANTS OF HEALTH (SDOH): HOW HARD IS IT FOR YOU TO PAY FOR THE VERY BASICS LIKE FOOD, HOUSING, MEDICAL CARE, AND HEATING?: NOT HARD AT ALL

## 2022-03-08 LAB
HIV AG/AB: NORMAL
HIV ANTIGEN: NORMAL
HIV-1 ANTIBODY: NORMAL
HIV-2 AB: NORMAL

## 2022-09-06 ENCOUNTER — HOSPITAL ENCOUNTER (OUTPATIENT)
Age: 57
Discharge: HOME | End: 2022-09-06
Payer: COMMERCIAL

## 2022-09-06 DIAGNOSIS — I25.10 CORONARY ARTERY DISEASE INVOLVING NATIVE CORONARY ARTERY OF NATIVE HEART WITHOUT ANGINA PECTORIS: ICD-10-CM

## 2022-09-06 DIAGNOSIS — Z12.5 SPECIAL SCREENING FOR MALIGNANT NEOPLASM OF PROSTATE: ICD-10-CM

## 2022-09-06 LAB
A/G RATIO: 2.3 (ref 1.1–2.2)
ALBUMIN SERPL-MCNC: 4.6 G/DL (ref 3.4–5)
ALP BLD-CCNC: 58 U/L (ref 40–129)
ALT SERPL-CCNC: 19 U/L (ref 10–40)
ANION GAP SERPL CALCULATED.3IONS-SCNC: 14 MMOL/L (ref 3–16)
AST SERPL-CCNC: 20 U/L (ref 15–37)
BILIRUB SERPL-MCNC: 0.5 MG/DL (ref 0–1)
BUN BLDV-MCNC: 20 MG/DL (ref 7–20)
CALCIUM SERPL-MCNC: 9.3 MG/DL (ref 8.3–10.6)
CHLORIDE BLD-SCNC: 102 MMOL/L (ref 99–110)
CHOLESTEROL, TOTAL: 213 MG/DL (ref 0–199)
CO2: 24 MMOL/L (ref 21–32)
CREAT SERPL-MCNC: 1 MG/DL (ref 0.9–1.3)
GFR AFRICAN AMERICAN: >60
GFR NON-AFRICAN AMERICAN: >60
GLUCOSE BLD-MCNC: 95 MG/DL (ref 70–99)
HDLC SERPL-MCNC: 41 MG/DL (ref 40–60)
LDL CHOLESTEROL CALCULATED: 150 MG/DL
POTASSIUM SERPL-SCNC: 4.6 MMOL/L (ref 3.5–5.1)
PROSTATE SPECIFIC ANTIGEN: 1.16 NG/ML (ref 0–4)
SODIUM BLD-SCNC: 140 MMOL/L (ref 136–145)
TOTAL PROTEIN: 6.6 G/DL (ref 6.4–8.2)
TRIGL SERPL-MCNC: 111 MG/DL (ref 0–150)
VLDLC SERPL CALC-MCNC: 22 MG/DL

## 2022-09-06 PROCEDURE — 80061 LIPID PANEL: CPT

## 2022-09-06 PROCEDURE — 80053 COMPREHEN METABOLIC PANEL: CPT

## 2022-09-06 PROCEDURE — 84153 ASSAY OF PSA TOTAL: CPT

## 2022-09-06 PROCEDURE — 36415 COLL VENOUS BLD VENIPUNCTURE: CPT

## 2022-09-18 DIAGNOSIS — E78.2 MIXED HYPERLIPIDEMIA: ICD-10-CM

## 2022-09-19 DIAGNOSIS — I25.10 CORONARY ARTERY DISEASE INVOLVING NATIVE CORONARY ARTERY OF NATIVE HEART WITHOUT ANGINA PECTORIS: ICD-10-CM

## 2022-09-19 RX ORDER — EZETIMIBE 10 MG/1
TABLET ORAL
Qty: 90 TABLET | Refills: 1 | Status: SHIPPED | OUTPATIENT
Start: 2022-09-19

## 2022-09-19 NOTE — TELEPHONE ENCOUNTER
Refill request for zetia medication.      [facility]      Last visit - 3/7/22     Pending visit - 9/29/22    Last refill -9/2/21      Medication Contract signed -   Last Oarrs ran-         Additional Comments

## 2022-09-20 RX ORDER — ROSUVASTATIN CALCIUM 40 MG/1
TABLET, COATED ORAL
Qty: 90 TABLET | Refills: 1 | Status: SHIPPED | OUTPATIENT
Start: 2022-09-20

## 2022-10-16 NOTE — PROGRESS NOTES
Angeli Bahena 59 y.o. male presents today for an Established patient for   Chief Complaint   Patient presents with    Follow-up            ASSESSMENT/PLAN    1. Coronary artery disease involving native coronary artery of native heart without angina pectoris                No chest pain or shortness of breath. 2. Mixed hyperlipidemia                   Borderline elevated lipids. Intolerance to statins. .  Discussed use of Crestor 40 mg once a day. Add Lovaza 1 g capsules twice daily. New prescription repeat fasting laboratory studies    3. Primary hypertension              BP: 146/76 elevated off Norvasc. Plan: Restart Norvasc 5 mg take 1/2 pill a day    4. Obstructive sleep apnea syndrome              Continue present treatment    5. Harry's esophagus with dysplasia              Follow-up with GI    6. Periodic limb movement                    Continue to monitor    7. Celiac sprue                  Stable at this time    8. Paresthesia of bilateral legs                 Onset with COVID. Has gradually improved    9. Need for influenza vaccination                   Health maintenance. - Influenza, FLUCELVAX, (age 10 mo+), IM, Preservative Free, 0.5 mL       Return in about 3 months (around 1/18/2023) for HTN   CAD. HPI:            Reviewed last office visit with me: 3/7/2022. CAD on Zetia and Crestor. Hyperlipidemia laboratory results were not available. PRAIVN baseline. Celiac sprue baseline. Harry's esophagus discussed dietary GI restriction sheet. Periodic limb movements stable. HTN elevated started Norvasc 5 mg take 1/2 pill a day. Medicine and Allergies Reviewed:  Reviewed Laboratory Data: 9/6/2022: Chemistry panel unremarkable. Lipid panel: Total cholesterol: 213. LDL cholesterol 150. Slight improvement but still elevated. PSA 1.16  Health Maintenance Reviewed: Influenza vaccine.        Today:     Patient states has been off Norvasc for about a year per cardiologist.  States infected with COVID in March. 3 to 4 months ago complained of onset \"bubbles\" going through his legs bilaterally at night in bed. This occurred post COVID. It is gradually improved. No definite weakness. Results for orders placed or performed during the hospital encounter of 09/06/22   PSA Screening   Result Value Ref Range    PSA 1.16 0.00 - 4.00 ng/mL   Lipid Panel   Result Value Ref Range    Cholesterol, Total 213 (H) 0 - 199 mg/dL    Triglycerides 111 0 - 150 mg/dL    HDL 41 40 - 60 mg/dL    LDL Calculated 150 (H) <100 mg/dL    VLDL Cholesterol Calculated 22 Not Established mg/dL   Comprehensive Metabolic Panel   Result Value Ref Range    Sodium 140 136 - 145 mmol/L    Potassium 4.6 3.5 - 5.1 mmol/L    Chloride 102 99 - 110 mmol/L    CO2 24 21 - 32 mmol/L    Anion Gap 14 3 - 16    Glucose 95 70 - 99 mg/dL    BUN 20 7 - 20 mg/dL    Creatinine 1.0 0.9 - 1.3 mg/dL    GFR Non-African American >60 >60    GFR African American >60 >60    Calcium 9.3 8.3 - 10.6 mg/dL    Total Protein 6.6 6.4 - 8.2 g/dL    Albumin 4.6 3.4 - 5.0 g/dL    Albumin/Globulin Ratio 2.3 (H) 1.1 - 2.2    Total Bilirubin 0.5 0.0 - 1.0 mg/dL    Alkaline Phosphatase 58 40 - 129 U/L    ALT 19 10 - 40 U/L    AST 20 15 - 37 U/L              ROS:  Review of Systems   Constitutional: negative   HENT: negative   EYES: negative   Respiratory: negative   Gastrointestinal: negative   Endocrine: negative   Musculoskeletal: negative   Skin: negative   Allergic/Immunological: negative   Hematological: negative  Infectious Disease: COVID + March 2022  Psychiatric/Behavorial: negative   CV: Off Norvasc. CNS: negative   :Negative   S/E:Negative  Renal: Negative     Physical Exam:  Head/neck: Ears: Normal TM. No obstruction. Throat: Mask. Not examined. Thyroid is not palpable. Neck: No lymphadenopathy. Eyes: EOMI, PERRLA with no nystagmus  Lungs: Clear to auscultation. CV: S1-S2 normal.   VANITA murmur. Carotid: No bruit.   Abdominal Examination: Bowel sounds present. Soft nontender. No mass no guarding or   rebound. Spine/extremities: No edema. No tenderness to palpation. Skin: No rash  CNS: Patient is alert, cooperative, moves all 4 limbs, ambulates without difficulty, light touch normal.  Good historian. Good orientation. Blood pressure 134/78, pulse 74, temperature 98.2 °F (36.8 °C), temperature source Temporal, weight 199 lb 9.6 oz (90.5 kg), SpO2 97 %.        Lima Heard MD

## 2022-10-18 ENCOUNTER — OFFICE VISIT (OUTPATIENT)
Dept: INTERNAL MEDICINE CLINIC | Age: 57
End: 2022-10-18
Payer: COMMERCIAL

## 2022-10-18 VITALS
TEMPERATURE: 98.2 F | SYSTOLIC BLOOD PRESSURE: 134 MMHG | OXYGEN SATURATION: 97 % | BODY MASS INDEX: 28.64 KG/M2 | WEIGHT: 199.6 LBS | DIASTOLIC BLOOD PRESSURE: 78 MMHG | HEART RATE: 74 BPM

## 2022-10-18 DIAGNOSIS — I25.10 CORONARY ARTERY DISEASE INVOLVING NATIVE CORONARY ARTERY OF NATIVE HEART WITHOUT ANGINA PECTORIS: Primary | ICD-10-CM

## 2022-10-18 DIAGNOSIS — R20.2 PARESTHESIA OF BILATERAL LEGS: ICD-10-CM

## 2022-10-18 DIAGNOSIS — G47.33 OBSTRUCTIVE SLEEP APNEA SYNDROME: ICD-10-CM

## 2022-10-18 DIAGNOSIS — K90.0 CELIAC SPRUE: ICD-10-CM

## 2022-10-18 DIAGNOSIS — E78.2 MIXED HYPERLIPIDEMIA: ICD-10-CM

## 2022-10-18 DIAGNOSIS — Z23 NEED FOR INFLUENZA VACCINATION: ICD-10-CM

## 2022-10-18 DIAGNOSIS — K22.719 BARRETT'S ESOPHAGUS WITH DYSPLASIA: ICD-10-CM

## 2022-10-18 DIAGNOSIS — I10 PRIMARY HYPERTENSION: ICD-10-CM

## 2022-10-18 DIAGNOSIS — G47.61 PERIODIC LIMB MOVEMENT: ICD-10-CM

## 2022-10-18 PROCEDURE — 90471 IMMUNIZATION ADMIN: CPT | Performed by: INTERNAL MEDICINE

## 2022-10-18 PROCEDURE — 99214 OFFICE O/P EST MOD 30 MIN: CPT | Performed by: INTERNAL MEDICINE

## 2022-10-18 PROCEDURE — 90674 CCIIV4 VAC NO PRSV 0.5 ML IM: CPT | Performed by: INTERNAL MEDICINE

## 2022-10-18 RX ORDER — OMEGA-3-ACID ETHYL ESTERS 1 G/1
2 CAPSULE, LIQUID FILLED ORAL 2 TIMES DAILY
Qty: 60 CAPSULE | Refills: 6 | Status: SHIPPED | OUTPATIENT
Start: 2022-10-18

## 2022-10-18 RX ORDER — AMLODIPINE BESYLATE 5 MG/1
5 TABLET ORAL DAILY
Qty: 30 TABLET | Refills: 6 | Status: SHIPPED | OUTPATIENT
Start: 2022-10-18

## 2023-04-04 DIAGNOSIS — I25.10 CORONARY ARTERY DISEASE INVOLVING NATIVE CORONARY ARTERY OF NATIVE HEART WITHOUT ANGINA PECTORIS: ICD-10-CM

## 2023-04-04 DIAGNOSIS — E78.2 MIXED HYPERLIPIDEMIA: ICD-10-CM

## 2023-04-04 NOTE — TELEPHONE ENCOUNTER
Refill request for rosuvastatin (CRESTOR) 40 MG tablet, ezetimibe (ZETIA) 10 MG tablet medication. Name of Roland Fernandez      Last visit - 10/18/22     Pending visit - None    Last refill - 9/20/22,       Medication Contract signed - PDMP Monitoring:    Last PDMP Kaur Lee as Reviewed:  Review User Review Instant Review Result          [unfilled]  Urine Drug Screenings (1 yr)    No resulted procedures found.        Medication Contract and Consent for Opioid Use Documents Filed        No documents found                   Last Essie Jhaveri ran-         Additional Comments

## 2023-04-05 RX ORDER — ROSUVASTATIN CALCIUM 40 MG/1
TABLET, COATED ORAL
Qty: 90 TABLET | Refills: 1 | Status: SHIPPED | OUTPATIENT
Start: 2023-04-05

## 2023-04-05 RX ORDER — EZETIMIBE 10 MG/1
TABLET ORAL
Qty: 90 TABLET | Refills: 1 | Status: SHIPPED | OUTPATIENT
Start: 2023-04-05

## 2023-04-05 NOTE — TELEPHONE ENCOUNTER
Called and tried to schedule an appointment with patient, he is very confused to why you want him to have a 6 month follow up. He is concerned that he will have to miss work.        please advise

## 2023-04-20 ENCOUNTER — OFFICE VISIT (OUTPATIENT)
Dept: INTERNAL MEDICINE CLINIC | Age: 58
End: 2023-04-20
Payer: COMMERCIAL

## 2023-04-20 VITALS
HEIGHT: 70 IN | HEART RATE: 73 BPM | BODY MASS INDEX: 29.35 KG/M2 | SYSTOLIC BLOOD PRESSURE: 128 MMHG | RESPIRATION RATE: 12 BRPM | WEIGHT: 205 LBS | OXYGEN SATURATION: 95 % | DIASTOLIC BLOOD PRESSURE: 72 MMHG | TEMPERATURE: 97.5 F

## 2023-04-20 DIAGNOSIS — Z82.49 FAMILY HISTORY OF EARLY CAD: ICD-10-CM

## 2023-04-20 DIAGNOSIS — E78.2 MIXED HYPERLIPIDEMIA: ICD-10-CM

## 2023-04-20 DIAGNOSIS — E78.41 ELEVATED LIPOPROTEIN(A): Primary | ICD-10-CM

## 2023-04-20 DIAGNOSIS — R93.1 ELEVATED CORONARY ARTERY CALCIUM SCORE: ICD-10-CM

## 2023-04-20 DIAGNOSIS — E78.5 ELEVATED LIPIDS: ICD-10-CM

## 2023-04-20 DIAGNOSIS — E66.3 OVERWEIGHT (BMI 25.0-29.9): ICD-10-CM

## 2023-04-20 DIAGNOSIS — I25.10 CORONARY ARTERY DISEASE INVOLVING NATIVE CORONARY ARTERY OF NATIVE HEART WITHOUT ANGINA PECTORIS: ICD-10-CM

## 2023-04-20 DIAGNOSIS — Z00.00 WELL ADULT EXAM: ICD-10-CM

## 2023-04-20 DIAGNOSIS — K22.719 BARRETT'S ESOPHAGUS WITH DYSPLASIA: ICD-10-CM

## 2023-04-20 PROCEDURE — 99214 OFFICE O/P EST MOD 30 MIN: CPT | Performed by: NURSE PRACTITIONER

## 2023-04-20 RX ORDER — OMEGA-3-ACID ETHYL ESTERS 1 G/1
2 CAPSULE, LIQUID FILLED ORAL 2 TIMES DAILY
Qty: 120 CAPSULE | Refills: 2 | Status: SHIPPED | OUTPATIENT
Start: 2023-04-20

## 2023-04-20 SDOH — ECONOMIC STABILITY: FOOD INSECURITY: WITHIN THE PAST 12 MONTHS, THE FOOD YOU BOUGHT JUST DIDN'T LAST AND YOU DIDN'T HAVE MONEY TO GET MORE.: NEVER TRUE

## 2023-04-20 SDOH — ECONOMIC STABILITY: HOUSING INSECURITY
IN THE LAST 12 MONTHS, WAS THERE A TIME WHEN YOU DID NOT HAVE A STEADY PLACE TO SLEEP OR SLEPT IN A SHELTER (INCLUDING NOW)?: NO

## 2023-04-20 SDOH — ECONOMIC STABILITY: INCOME INSECURITY: HOW HARD IS IT FOR YOU TO PAY FOR THE VERY BASICS LIKE FOOD, HOUSING, MEDICAL CARE, AND HEATING?: NOT HARD AT ALL

## 2023-04-20 SDOH — ECONOMIC STABILITY: FOOD INSECURITY: WITHIN THE PAST 12 MONTHS, YOU WORRIED THAT YOUR FOOD WOULD RUN OUT BEFORE YOU GOT MONEY TO BUY MORE.: NEVER TRUE

## 2023-04-20 ASSESSMENT — PATIENT HEALTH QUESTIONNAIRE - PHQ9
7. TROUBLE CONCENTRATING ON THINGS, SUCH AS READING THE NEWSPAPER OR WATCHING TELEVISION: 0
1. LITTLE INTEREST OR PLEASURE IN DOING THINGS: 0
3. TROUBLE FALLING OR STAYING ASLEEP: 0
SUM OF ALL RESPONSES TO PHQ QUESTIONS 1-9: 0
6. FEELING BAD ABOUT YOURSELF - OR THAT YOU ARE A FAILURE OR HAVE LET YOURSELF OR YOUR FAMILY DOWN: 0
10. IF YOU CHECKED OFF ANY PROBLEMS, HOW DIFFICULT HAVE THESE PROBLEMS MADE IT FOR YOU TO DO YOUR WORK, TAKE CARE OF THINGS AT HOME, OR GET ALONG WITH OTHER PEOPLE: 0
SUM OF ALL RESPONSES TO PHQ9 QUESTIONS 1 & 2: 0
9. THOUGHTS THAT YOU WOULD BE BETTER OFF DEAD, OR OF HURTING YOURSELF: 0
SUM OF ALL RESPONSES TO PHQ QUESTIONS 1-9: 0
8. MOVING OR SPEAKING SO SLOWLY THAT OTHER PEOPLE COULD HAVE NOTICED. OR THE OPPOSITE, BEING SO FIGETY OR RESTLESS THAT YOU HAVE BEEN MOVING AROUND A LOT MORE THAN USUAL: 0
2. FEELING DOWN, DEPRESSED OR HOPELESS: 0
4. FEELING TIRED OR HAVING LITTLE ENERGY: 0
5. POOR APPETITE OR OVEREATING: 0

## 2023-04-20 ASSESSMENT — ENCOUNTER SYMPTOMS
DIARRHEA: 0
COUGH: 0
SINUS PAIN: 0
CONSTIPATION: 0
VOMITING: 0
SHORTNESS OF BREATH: 0
CHEST TIGHTNESS: 0
SORE THROAT: 0
NAUSEA: 0

## 2023-04-20 ASSESSMENT — ANXIETY QUESTIONNAIRES
5. BEING SO RESTLESS THAT IT IS HARD TO SIT STILL: 0
IF YOU CHECKED OFF ANY PROBLEMS ON THIS QUESTIONNAIRE, HOW DIFFICULT HAVE THESE PROBLEMS MADE IT FOR YOU TO DO YOUR WORK, TAKE CARE OF THINGS AT HOME, OR GET ALONG WITH OTHER PEOPLE: NOT DIFFICULT AT ALL
GAD7 TOTAL SCORE: 0
6. BECOMING EASILY ANNOYED OR IRRITABLE: 0
3. WORRYING TOO MUCH ABOUT DIFFERENT THINGS: 0
4. TROUBLE RELAXING: 0
2. NOT BEING ABLE TO STOP OR CONTROL WORRYING: 0
1. FEELING NERVOUS, ANXIOUS, OR ON EDGE: 0
7. FEELING AFRAID AS IF SOMETHING AWFUL MIGHT HAPPEN: 0

## 2023-04-20 NOTE — PROGRESS NOTES
Value               Date                       CHOL                     213 (H)             09/06/2022                 TRIG                     111                 09/06/2022                 HDL                      41                  09/06/2022                 LDLCALC                  150 (H)             09/06/2022            Lab Results       Component                Value               Date                       ALT                      19                  09/06/2022                 AST                      20                  09/06/2022                    Past Medical History:   Diagnosis Date    Allergic     Anemia before 2003    Harry's esophagus 2003    Celiac sprue 2003    Diverticulitis 1/7/2014    Family history of early CAD     Father Death 54 MI    Hyperlipidemia     Normochromic normocytic anemia 3/19/2013    Anemia: 1. First seen for a normocytic anemia by Dr. David Johnson, 1/09/2013. A. WBC 6.2 K/mcL, HGB 14.2 g/dL, HCT 40.2%,  K/mcL, MCV 87.1 fL, ANC 2.91 K/mcL. B. Ferritin 315.4 ng/mL, iron sat 22%, absolute retic count 52 K/mcL. 2. Ultrasound of the abdomen, 2/22/2013, showed a fatty liver versus diffuse hepatocellular disease. Spleen was normal.  3. Anemia evaluation, 10/14/2016, showed: A.     Obesity 3/18/2015    Seasonal allergies     Unspecified sleep apnea         Past Surgical History:   Procedure Laterality Date    COLONOSCOPY  06/30/2003    Diverticulosis    COLONOSCOPY  08/20/2009    Diverticulosis/hyperplastic polyp    COLONOSCOPY  11/05/2012    Hyperplastic Polyp /Divertics REDO 5yrs    COLONOSCOPY  12/19/2017    poor prep / polyp    COLONOSCOPY N/A 03/23/2021    2 polyps. Diverticulosis. Internal hemorrhoids. Path: Hyperplastic.   Redo 5 years: Dr. Sagar Sneed  06/30/2003    Harry's, villous Atrophy (Negative Antibodies)    UPPER GASTROINTESTINAL ENDOSCOPY  11/02/2010    GERD w/o Harry's, Villous atrophy

## 2023-07-14 DIAGNOSIS — E78.2 MIXED HYPERLIPIDEMIA: ICD-10-CM

## 2023-07-14 DIAGNOSIS — I25.10 CORONARY ARTERY DISEASE INVOLVING NATIVE CORONARY ARTERY OF NATIVE HEART WITHOUT ANGINA PECTORIS: ICD-10-CM

## 2023-07-14 DIAGNOSIS — E78.41 ELEVATED LIPOPROTEIN(A): ICD-10-CM

## 2023-07-14 RX ORDER — EZETIMIBE 10 MG/1
10 TABLET ORAL DAILY
Qty: 90 TABLET | Refills: 3 | Status: SHIPPED | OUTPATIENT
Start: 2023-07-14

## 2023-07-14 RX ORDER — ROSUVASTATIN CALCIUM 40 MG/1
TABLET, COATED ORAL
Qty: 90 TABLET | Refills: 3 | Status: SHIPPED | OUTPATIENT
Start: 2023-07-14

## 2023-07-14 RX ORDER — AMLODIPINE BESYLATE 5 MG/1
5 TABLET ORAL DAILY
Qty: 90 TABLET | Refills: 3 | Status: SHIPPED | OUTPATIENT
Start: 2023-07-14

## 2023-10-03 DIAGNOSIS — I25.10 CORONARY ARTERY DISEASE INVOLVING NATIVE CORONARY ARTERY OF NATIVE HEART WITHOUT ANGINA PECTORIS: ICD-10-CM

## 2023-10-03 DIAGNOSIS — E78.41 ELEVATED LIPOPROTEIN(A): ICD-10-CM

## 2023-10-03 DIAGNOSIS — E78.2 MIXED HYPERLIPIDEMIA: ICD-10-CM

## 2023-10-03 NOTE — TELEPHONE ENCOUNTER
Refill request for omega-3 acid ethyl esters (LOVAZA) 1 g capsule, ezetimibe (ZETIA) 10 MG tablet, rosuvastatin (CRESTOR) 40 MG tablet, amLODIPine (NORVASC) 5 MG tablet medication.      Name of Anamika Juan Otero       Last visit - 4-     Pending visit - 10-    Last refill - 7-, 4-      Medication Contract signed -   Last Emir berry-         Additional Comments

## 2023-10-03 NOTE — TELEPHONE ENCOUNTER
Refill request for omega-3 acid ethyl esters (LOVAZA) 1 g capsule medication.      Name of [address] [place] [address]      Last visit - 4-     Pending visit - 10-    Last refill - 4-      Medication Contract signed -   Sabas berry-         Additional Comments

## 2023-10-04 RX ORDER — EZETIMIBE 10 MG/1
10 TABLET ORAL DAILY
Qty: 90 TABLET | Refills: 3 | Status: SHIPPED | OUTPATIENT
Start: 2023-10-04

## 2023-10-04 RX ORDER — AMLODIPINE BESYLATE 5 MG/1
5 TABLET ORAL DAILY
Qty: 90 TABLET | Refills: 3 | Status: SHIPPED | OUTPATIENT
Start: 2023-10-04

## 2023-10-04 RX ORDER — OMEGA-3-ACID ETHYL ESTERS 1 G/1
2 CAPSULE, LIQUID FILLED ORAL 2 TIMES DAILY
Qty: 120 CAPSULE | Refills: 2 | Status: SHIPPED | OUTPATIENT
Start: 2023-10-04

## 2023-10-04 RX ORDER — ROSUVASTATIN CALCIUM 40 MG/1
TABLET, COATED ORAL
Qty: 90 TABLET | Refills: 3 | Status: SHIPPED | OUTPATIENT
Start: 2023-10-04

## 2023-10-06 ENCOUNTER — HOSPITAL ENCOUNTER (OUTPATIENT)
Age: 58
Discharge: HOME | End: 2023-10-06
Payer: COMMERCIAL

## 2023-10-06 DIAGNOSIS — Z00.00 WELL ADULT EXAM: ICD-10-CM

## 2023-10-06 DIAGNOSIS — E78.41 ELEVATED LIPOPROTEIN(A): ICD-10-CM

## 2023-10-06 LAB
25(OH)D3 SERPL-MCNC: 28.9 NG/ML
ALBUMIN SERPL-MCNC: 4.4 G/DL (ref 3.4–5)
ALBUMIN/GLOB SERPL: 1.8 {RATIO} (ref 1.1–2.2)
ALP SERPL-CCNC: 49 U/L (ref 40–129)
ALT SERPL-CCNC: 21 U/L (ref 10–40)
ANION GAP SERPL CALCULATED.3IONS-SCNC: 6 MMOL/L (ref 3–16)
AST SERPL-CCNC: 21 U/L (ref 15–37)
BASOPHILS # BLD: 0.1 K/UL (ref 0–0.2)
BASOPHILS NFR BLD: 0.9 %
BILIRUB SERPL-MCNC: 0.5 MG/DL (ref 0–1)
BUN SERPL-MCNC: 20 MG/DL (ref 7–20)
CALCIUM SERPL-MCNC: 9.7 MG/DL (ref 8.3–10.6)
CHLORIDE SERPL-SCNC: 103 MMOL/L (ref 99–110)
CHOLEST SERPL-MCNC: 173 MG/DL (ref 0–199)
CO2 SERPL-SCNC: 29 MMOL/L (ref 21–32)
CREAT SERPL-MCNC: 1 MG/DL (ref 0.8–1.3)
DEPRECATED RDW RBC AUTO: 12.9 % (ref 12.4–15.4)
EOSINOPHIL # BLD: 0.5 K/UL (ref 0–0.6)
EOSINOPHIL NFR BLD: 7 %
EST. AVERAGE GLUCOSE BLD GHB EST-MCNC: 122.6 MG/DL
GFR SERPLBLD CREATININE-BSD FMLA CKD-EPI: >60 ML/MIN/{1.73_M2}
GLUCOSE SERPL-MCNC: 106 MG/DL (ref 70–99)
HBA1C MFR BLD: 5.9 %
HCT VFR BLD AUTO: 39.4 % (ref 40.5–52.5)
HDLC SERPL-MCNC: 38 MG/DL (ref 40–60)
HGB BLD-MCNC: 13.4 G/DL (ref 13.5–17.5)
LDLC SERPL CALC-MCNC: 121 MG/DL
LYMPHOCYTES # BLD: 1.8 K/UL (ref 1–5.1)
LYMPHOCYTES NFR BLD: 27.6 %
MCH RBC QN AUTO: 30.5 PG (ref 26–34)
MCHC RBC AUTO-ENTMCNC: 34.1 G/DL (ref 31–36)
MCV RBC AUTO: 89.4 FL (ref 80–100)
MONOCYTES # BLD: 0.8 K/UL (ref 0–1.3)
MONOCYTES NFR BLD: 11.5 %
NEUTROPHILS # BLD: 3.5 K/UL (ref 1.7–7.7)
NEUTROPHILS NFR BLD: 53 %
PLATELET # BLD AUTO: 222 K/UL (ref 135–450)
PMV BLD AUTO: 9.3 FL (ref 5–10.5)
POTASSIUM SERPL-SCNC: 4.5 MMOL/L (ref 3.5–5.1)
PROT SERPL-MCNC: 6.9 G/DL (ref 6.4–8.2)
RBC # BLD AUTO: 4.41 M/UL (ref 4.2–5.9)
SODIUM SERPL-SCNC: 138 MMOL/L (ref 136–145)
TRIGL SERPL-MCNC: 70 MG/DL (ref 0–150)
VLDLC SERPL CALC-MCNC: 14 MG/DL
WBC # BLD AUTO: 6.6 K/UL (ref 4–11)

## 2023-10-06 PROCEDURE — 83036 HEMOGLOBIN GLYCOSYLATED A1C: CPT

## 2023-10-06 PROCEDURE — 82306 VITAMIN D 25 HYDROXY: CPT

## 2023-10-06 PROCEDURE — 36415 COLL VENOUS BLD VENIPUNCTURE: CPT

## 2023-10-06 PROCEDURE — 85025 COMPLETE CBC W/AUTO DIFF WBC: CPT

## 2023-10-06 PROCEDURE — 80061 LIPID PANEL: CPT

## 2023-10-06 PROCEDURE — 80053 COMPREHEN METABOLIC PANEL: CPT

## 2023-10-06 PROCEDURE — 84153 ASSAY OF PSA TOTAL: CPT

## 2023-10-07 LAB — PSA SERPL DL<=0.01 NG/ML-MCNC: 1.35 NG/ML (ref 0–4)

## 2023-10-20 ENCOUNTER — OFFICE VISIT (OUTPATIENT)
Dept: INTERNAL MEDICINE CLINIC | Age: 58
End: 2023-10-20

## 2023-10-20 VITALS
DIASTOLIC BLOOD PRESSURE: 80 MMHG | WEIGHT: 208.4 LBS | HEART RATE: 74 BPM | OXYGEN SATURATION: 97 % | SYSTOLIC BLOOD PRESSURE: 138 MMHG | HEIGHT: 70 IN | TEMPERATURE: 97.2 F | BODY MASS INDEX: 29.84 KG/M2

## 2023-10-20 DIAGNOSIS — I25.10 CORONARY ARTERY DISEASE INVOLVING NATIVE CORONARY ARTERY OF NATIVE HEART WITHOUT ANGINA PECTORIS: ICD-10-CM

## 2023-10-20 DIAGNOSIS — K22.719 BARRETT'S ESOPHAGUS WITH DYSPLASIA: ICD-10-CM

## 2023-10-20 DIAGNOSIS — E66.3 OVERWEIGHT (BMI 25.0-29.9): ICD-10-CM

## 2023-10-20 DIAGNOSIS — G47.33 OBSTRUCTIVE SLEEP APNEA SYNDROME: ICD-10-CM

## 2023-10-20 DIAGNOSIS — Z23 NEED FOR INFLUENZA VACCINATION: Primary | ICD-10-CM

## 2023-10-20 DIAGNOSIS — E78.2 MIXED HYPERLIPIDEMIA: ICD-10-CM

## 2023-10-20 DIAGNOSIS — R93.1 ELEVATED CORONARY ARTERY CALCIUM SCORE: ICD-10-CM

## 2023-10-20 ASSESSMENT — ENCOUNTER SYMPTOMS
NAUSEA: 0
CONSTIPATION: 0
VOMITING: 0
DIARRHEA: 0
SHORTNESS OF BREATH: 0
SORE THROAT: 0
CHEST TIGHTNESS: 0
COUGH: 0
SINUS PAIN: 0

## 2023-10-20 NOTE — PROGRESS NOTES
[address] [facility]  [address] [facility]  [facility]  Tel:[phone]    Ginger Nolasco is a 59 y.o. male who presents today for his medical conditions/complaints as noted below. Ginger Nolasco is c/o of Follow-up and Discuss Labs      Chief Complaint   Patient presents with    Follow-up    Discuss Labs       HPI:     Mr. Concepción Alan presents for his semi routine check up. He states he is overall well. Continues his current treatments and states he is tolerating them well. HTN/HLD Treatment Adherence:   Medication compliance:  compliant all of the time  Diet compliance:  compliant most of the time  Weight trend: stable  Current exercise: no regular exercise however plans to begin yard work now that spring has arrived  Barriers: none     Hypertension:  Home blood pressure monitoring: Yes - intermittent and at grocery stores. Patient denies chest pain, shortness of breath, headache, lightheadedness, blurred vision, peripheral edema, palpitations, dry cough, and fatigue. Antihypertensive medication side effects: no medication side effects noted. Use of agents associated with hypertension: none. Hyperlipidemia:  No new myalgias or GI upset on eztemibe (Zetia), rosuvastatin (Crestor), and Lovaza. Entire medical history, surgical history, family history, allergies, social history, and health maintenance items reviewed and updated as captured in the relevant section of patient's chart. Past Medical History:   Diagnosis Date    Allergic     Anemia before 2003    Harry's esophagus 2003    Celiac sprue 2003    Diverticulitis 1/7/2014    Elevated lipids 1/7/2014    Family history of early CAD     Father Death 54 MI    Hyperlipidemia     Normochromic normocytic anemia 3/19/2013    Anemia: 1. First seen for a normocytic anemia by Dr. Nataliya Gutiérrez, 1/09/2013. A. WBC 6.2 K/mcL, HGB 14.2 g/dL, HCT 40.2%,  K/mcL, MCV 87.1 fL, ANC 2.91 K/mcL.  B. Ferritin

## 2023-12-29 DIAGNOSIS — E78.2 MIXED HYPERLIPIDEMIA: ICD-10-CM

## 2023-12-29 DIAGNOSIS — I25.10 CORONARY ARTERY DISEASE INVOLVING NATIVE CORONARY ARTERY OF NATIVE HEART WITHOUT ANGINA PECTORIS: ICD-10-CM

## 2023-12-29 RX ORDER — AMLODIPINE BESYLATE 5 MG/1
5 TABLET ORAL DAILY
Qty: 90 TABLET | Refills: 3 | Status: SHIPPED | OUTPATIENT
Start: 2023-12-29

## 2023-12-29 RX ORDER — EZETIMIBE 10 MG/1
10 TABLET ORAL DAILY
Qty: 90 TABLET | Refills: 3 | Status: SHIPPED | OUTPATIENT
Start: 2023-12-29

## 2023-12-29 RX ORDER — ROSUVASTATIN CALCIUM 40 MG/1
TABLET, COATED ORAL
Qty: 90 TABLET | Refills: 3 | Status: SHIPPED | OUTPATIENT
Start: 2023-12-29

## 2023-12-29 NOTE — TELEPHONE ENCOUNTER
Refill request for ezetimibe (ZETIA) 10 MG tablet,     rosuvastatin (CRESTOR) 40 MG tablet,     amLODIPine (NORVASC) 5 MG tablet      medication.      Name of [address] [place] [address]      Last visit - 10-     Pending visit - 4-    Last refill -   10-4-2023    Medication Contract signed -   Last Maria Fernanda berry-         Additional Comments

## 2024-03-14 DIAGNOSIS — E78.41 ELEVATED LIPOPROTEIN(A): ICD-10-CM

## 2024-03-14 RX ORDER — OMEGA-3-ACID ETHYL ESTERS 1 G/1
2 CAPSULE, LIQUID FILLED ORAL 2 TIMES DAILY
Qty: 120 CAPSULE | Refills: 2 | Status: SHIPPED | OUTPATIENT
Start: 2024-03-14

## 2024-03-14 NOTE — TELEPHONE ENCOUNTER
Refill request for  Lovaza  medication.     Name of [facility]       Last visit -  10/20/2023     Pending visit -  4/24/24    Last refill - 10/4/23      Medication Contract signed -    Last Oarrs ran-          Additional Comments

## 2024-04-22 SDOH — ECONOMIC STABILITY: FOOD INSECURITY: WITHIN THE PAST 12 MONTHS, THE FOOD YOU BOUGHT JUST DIDN'T LAST AND YOU DIDN'T HAVE MONEY TO GET MORE.: NEVER TRUE

## 2024-04-22 SDOH — ECONOMIC STABILITY: FOOD INSECURITY: WITHIN THE PAST 12 MONTHS, YOU WORRIED THAT YOUR FOOD WOULD RUN OUT BEFORE YOU GOT MONEY TO BUY MORE.: NEVER TRUE

## 2024-04-22 SDOH — ECONOMIC STABILITY: TRANSPORTATION INSECURITY
IN THE PAST 12 MONTHS, HAS LACK OF TRANSPORTATION KEPT YOU FROM MEETINGS, WORK, OR FROM GETTING THINGS NEEDED FOR DAILY LIVING?: NO

## 2024-04-22 SDOH — ECONOMIC STABILITY: INCOME INSECURITY: HOW HARD IS IT FOR YOU TO PAY FOR THE VERY BASICS LIKE FOOD, HOUSING, MEDICAL CARE, AND HEATING?: NOT HARD AT ALL

## 2024-04-22 ASSESSMENT — PATIENT HEALTH QUESTIONNAIRE - PHQ9
2. FEELING DOWN, DEPRESSED OR HOPELESS: NOT AT ALL
SUM OF ALL RESPONSES TO PHQ QUESTIONS 1-9: 0
1. LITTLE INTEREST OR PLEASURE IN DOING THINGS: NOT AT ALL
SUM OF ALL RESPONSES TO PHQ QUESTIONS 1-9: 0
SUM OF ALL RESPONSES TO PHQ QUESTIONS 1-9: 0
SUM OF ALL RESPONSES TO PHQ9 QUESTIONS 1 & 2: 0
SUM OF ALL RESPONSES TO PHQ9 QUESTIONS 1 & 2: 0
1. LITTLE INTEREST OR PLEASURE IN DOING THINGS: NOT AT ALL
2. FEELING DOWN, DEPRESSED OR HOPELESS: NOT AT ALL
SUM OF ALL RESPONSES TO PHQ QUESTIONS 1-9: 0

## 2024-04-23 DIAGNOSIS — Z00.00 WELL ADULT EXAM: Primary | ICD-10-CM

## 2024-04-24 ENCOUNTER — OFFICE VISIT (OUTPATIENT)
Dept: INTERNAL MEDICINE CLINIC | Age: 59
End: 2024-04-24
Payer: COMMERCIAL

## 2024-04-24 VITALS
DIASTOLIC BLOOD PRESSURE: 70 MMHG | WEIGHT: 208.6 LBS | SYSTOLIC BLOOD PRESSURE: 122 MMHG | HEIGHT: 70 IN | HEART RATE: 71 BPM | TEMPERATURE: 97.1 F | BODY MASS INDEX: 29.86 KG/M2 | OXYGEN SATURATION: 96 %

## 2024-04-24 DIAGNOSIS — R93.1 ELEVATED CORONARY ARTERY CALCIUM SCORE: Primary | ICD-10-CM

## 2024-04-24 DIAGNOSIS — I10 PRIMARY HYPERTENSION: ICD-10-CM

## 2024-04-24 DIAGNOSIS — I25.10 CORONARY ARTERY DISEASE INVOLVING NATIVE CORONARY ARTERY OF NATIVE HEART WITHOUT ANGINA PECTORIS: ICD-10-CM

## 2024-04-24 PROCEDURE — 99214 OFFICE O/P EST MOD 30 MIN: CPT | Performed by: NURSE PRACTITIONER

## 2024-04-24 PROCEDURE — 3074F SYST BP LT 130 MM HG: CPT | Performed by: NURSE PRACTITIONER

## 2024-04-24 PROCEDURE — 3078F DIAST BP <80 MM HG: CPT | Performed by: NURSE PRACTITIONER

## 2024-04-24 ASSESSMENT — ENCOUNTER SYMPTOMS
CONSTIPATION: 0
VOMITING: 0
SORE THROAT: 0
DIARRHEA: 0
SHORTNESS OF BREATH: 0
CHEST TIGHTNESS: 0
COUGH: 0
NAUSEA: 0
SINUS PAIN: 0

## 2024-04-24 NOTE — PROGRESS NOTES
[facility]ACT[facility]  [facility]  [address] , [place] [address]  Tel:[phone]    Hernan Chance is a 58 y.o. male who presents today for his medical conditions/complaints as noted below.  Hernan Chance is c/o of Follow-up (6 month follow up )      Chief Complaint   Patient presents with    Follow-up     6 month follow up        HPI:     Mr. Chance presents for his semi routine check up. He states he is overall well. Continues his current treatments and states he is tolerating them well.      HTN/HLD Treatment Adherence:   Medication compliance:  compliant all of the time  Diet compliance:  compliant most of the time  Weight trend: stable  Current exercise: no regular exercise however plans to begin yard work now that spring has arrived  Barriers: none     Hypertension:  Home blood pressure monitoring: Yes - intermittent and at grocery stores. Patient denies chest pain, shortness of breath, headache, lightheadedness, blurred vision, peripheral edema, palpitations, dry cough, and fatigue.  Antihypertensive medication side effects: no medication side effects noted.  Use of agents associated with hypertension: none.       Hyperlipidemia:  No new myalgias or GI upset on eztemibe (Zetia), rosuvastatin (Crestor), and Lovaza.       Entire medical history, surgical history, family history, allergies, social history, and health maintenance items reviewed and updated as captured in the relevant section of patient's chart.          Past Medical History:   Diagnosis Date    Allergic     Anemia before 2003    Harry's esophagus 2003    Celiac sprue 2003    Diverticulitis 1/7/2014    Elevated lipids 1/7/2014    Family history of early CAD     Father Death 55 MI    Hyperlipidemia     Normochromic normocytic anemia 3/19/2013    Anemia: 1. First seen for a normocytic anemia by Dr. Rodrigues, 1/09/2013. A. WBC 6.2 K/mcL, HGB 14.2 g/dL, HCT 40.2%,  K/mcL, MCV 87.1 fL, ANC 2.91

## 2024-06-12 DIAGNOSIS — E78.2 MIXED HYPERLIPIDEMIA: ICD-10-CM

## 2024-06-12 DIAGNOSIS — E78.41 ELEVATED LIPOPROTEIN(A): ICD-10-CM

## 2024-06-12 DIAGNOSIS — I25.10 CORONARY ARTERY DISEASE INVOLVING NATIVE CORONARY ARTERY OF NATIVE HEART WITHOUT ANGINA PECTORIS: ICD-10-CM

## 2024-06-12 RX ORDER — ROSUVASTATIN CALCIUM 40 MG/1
TABLET, COATED ORAL
Qty: 90 TABLET | Refills: 3 | Status: SHIPPED | OUTPATIENT
Start: 2024-06-12

## 2024-06-12 RX ORDER — EZETIMIBE 10 MG/1
10 TABLET ORAL DAILY
Qty: 90 TABLET | Refills: 3 | Status: SHIPPED | OUTPATIENT
Start: 2024-06-12

## 2024-06-12 RX ORDER — OMEGA-3-ACID ETHYL ESTERS 1 G/1
2 CAPSULE, LIQUID FILLED ORAL 2 TIMES DAILY
Qty: 120 CAPSULE | Refills: 2 | Status: SHIPPED | OUTPATIENT
Start: 2024-06-12

## 2024-06-12 RX ORDER — AMLODIPINE BESYLATE 5 MG/1
5 TABLET ORAL DAILY
Qty: 90 TABLET | Refills: 3 | Status: SHIPPED | OUTPATIENT
Start: 2024-06-12

## 2024-06-12 NOTE — TELEPHONE ENCOUNTER
Refill request for   Amlodipine ( Norvasc) 5 mg   Ezetimibe ( Zetia) 10 mg   Omega-3 acid ethyl esters ( Lovaza) 1 mg   Rosuvastatin ( Crestor) 40 mg    medication.     [facility] of [facility]acy- [facility]      Last visit - 4/24/24     Pending visit - 10/24/24    Last refill -12/29/23,12/29/23,3/14/24,12/29/23      Medication Contract signed -   Last Oarrs ran-         Additional Comments

## 2024-08-08 DIAGNOSIS — E78.41 ELEVATED LIPOPROTEIN(A): ICD-10-CM

## 2024-08-09 RX ORDER — OMEGA-3-ACID ETHYL ESTERS 1 G/1
2 CAPSULE, LIQUID FILLED ORAL 2 TIMES DAILY
Qty: 360 CAPSULE | Refills: 3 | Status: SHIPPED | OUTPATIENT
Start: 2024-08-09

## 2024-10-17 ENCOUNTER — HOSPITAL ENCOUNTER (OUTPATIENT)
Age: 59
Discharge: HOME | End: 2024-10-17
Payer: COMMERCIAL

## 2024-10-17 DIAGNOSIS — Z00.00 WELL ADULT EXAM: ICD-10-CM

## 2024-10-17 LAB
ALBUMIN SERPL-MCNC: 4.4 G/DL (ref 3.4–5)
ALBUMIN/GLOB SERPL: 1.8 {RATIO} (ref 1.1–2.2)
ALP SERPL-CCNC: 47 U/L (ref 40–129)
ALT SERPL-CCNC: 22 U/L (ref 10–40)
ANION GAP SERPL CALCULATED.3IONS-SCNC: 9 MMOL/L (ref 3–16)
AST SERPL-CCNC: 25 U/L (ref 15–37)
BILIRUB SERPL-MCNC: 0.4 MG/DL (ref 0–1)
BUN SERPL-MCNC: 18 MG/DL (ref 7–20)
CALCIUM SERPL-MCNC: 9.6 MG/DL (ref 8.3–10.6)
CHLORIDE SERPL-SCNC: 103 MMOL/L (ref 99–110)
CHOLEST SERPL-MCNC: 161 MG/DL (ref 0–199)
CO2 SERPL-SCNC: 26 MMOL/L (ref 21–32)
CREAT SERPL-MCNC: 1 MG/DL (ref 0.8–1.3)
EST. AVERAGE GLUCOSE BLD GHB EST-MCNC: 128.4 MG/DL
GFR SERPLBLD CREATININE-BSD FMLA CKD-EPI: 86 ML/MIN/{1.73_M2}
GLUCOSE SERPL-MCNC: 97 MG/DL (ref 70–99)
HBA1C MFR BLD: 6.1 %
HDLC SERPL-MCNC: 43 MG/DL (ref 40–60)
LDLC SERPL CALC-MCNC: 106 MG/DL
POTASSIUM SERPL-SCNC: 4.6 MMOL/L (ref 3.5–5.1)
PROT SERPL-MCNC: 6.8 G/DL (ref 6.4–8.2)
SODIUM SERPL-SCNC: 138 MMOL/L (ref 136–145)
TRIGL SERPL-MCNC: 60 MG/DL (ref 0–150)
VLDLC SERPL CALC-MCNC: 12 MG/DL

## 2024-10-17 PROCEDURE — 80053 COMPREHEN METABOLIC PANEL: CPT

## 2024-10-17 PROCEDURE — 83036 HEMOGLOBIN GLYCOSYLATED A1C: CPT

## 2024-10-17 PROCEDURE — 36415 COLL VENOUS BLD VENIPUNCTURE: CPT

## 2024-10-17 PROCEDURE — 80061 LIPID PANEL: CPT

## 2024-10-24 ENCOUNTER — OFFICE VISIT (OUTPATIENT)
Dept: INTERNAL MEDICINE CLINIC | Age: 59
End: 2024-10-24

## 2024-10-24 VITALS
HEIGHT: 70 IN | WEIGHT: 203.6 LBS | OXYGEN SATURATION: 95 % | DIASTOLIC BLOOD PRESSURE: 78 MMHG | TEMPERATURE: 98.1 F | HEART RATE: 70 BPM | SYSTOLIC BLOOD PRESSURE: 110 MMHG | BODY MASS INDEX: 29.15 KG/M2

## 2024-10-24 DIAGNOSIS — E78.2 MIXED HYPERLIPIDEMIA: ICD-10-CM

## 2024-10-24 DIAGNOSIS — Z23 NEED FOR INFLUENZA VACCINATION: Primary | ICD-10-CM

## 2024-10-24 DIAGNOSIS — I10 PRIMARY HYPERTENSION: ICD-10-CM

## 2024-10-24 DIAGNOSIS — E66.3 OVERWEIGHT (BMI 25.0-29.9): ICD-10-CM

## 2024-10-24 DIAGNOSIS — I25.10 CORONARY ARTERY DISEASE INVOLVING NATIVE CORONARY ARTERY OF NATIVE HEART WITHOUT ANGINA PECTORIS: ICD-10-CM

## 2024-10-24 DIAGNOSIS — R93.1 ELEVATED CORONARY ARTERY CALCIUM SCORE: ICD-10-CM

## 2024-10-24 ASSESSMENT — ENCOUNTER SYMPTOMS
COUGH: 0
DIARRHEA: 0
VOMITING: 0
SHORTNESS OF BREATH: 0
SORE THROAT: 0
NAUSEA: 0
CHEST TIGHTNESS: 0
SINUS PAIN: 0
CONSTIPATION: 0

## 2024-10-24 NOTE — PROGRESS NOTES
[facility]YSICIAN PRACTICES  [facility]  [address], [place] [address]  Tel:[phone]    Hernan Chance is a 59 y.o. male who presents today for his medical conditions/complaints as noted below.  Hernan Chance is c/o of Follow-up (6 month follow up [facility], flu shot )    Chief Complaint   Patient presents with    Follow-up     6 month follow up [facility], flu shot      HPI:     Mr. Chance presents for his semi routine check up. He states he is overall well. Continues his current treatments and states he is tolerating them well. Has recently began exercising and is noticing joint pain/difficulty gaining strength. He is planning to continue exercising however as he feels this is likely due to an extended period of inactivity during COVID. Plans to begin dietary improvement as well including increasing protein. Typically consumes a healthy diet regardless (avoids takeout/fast food, portion moderation, alcohol limitation).     Colonoscopy scheduled for this coming Monday 10/28.      HTN/HLD Treatment Adherence:   Medication compliance:  compliant all of the time  Diet compliance:  compliant most of the time  Weight trend: stable  Current exercise: Has begun strength training  Barriers: none     Hypertension:  Home blood pressure monitoring: Yes - intermittent and at grocery stores. Patient denies chest pain, shortness of breath, headache, lightheadedness, blurred vision, peripheral edema, palpitations, dry cough, and fatigue.  Antihypertensive medication side effects: no medication side effects noted.  Use of agents associated with hypertension: none.       Hyperlipidemia:  No new myalgias or GI upset on eztemibe (Zetia), rosuvastatin (Crestor), and Lovaza.       Entire medical history, surgical history, family history, allergies, social history, and health maintenance items reviewed and updated as captured in the relevant section of patient's chart.             Past Medical History:

## 2025-04-21 ASSESSMENT — PATIENT HEALTH QUESTIONNAIRE - PHQ9
1. LITTLE INTEREST OR PLEASURE IN DOING THINGS: NOT AT ALL
1. LITTLE INTEREST OR PLEASURE IN DOING THINGS: NOT AT ALL
SUM OF ALL RESPONSES TO PHQ9 QUESTIONS 1 & 2: 0
SUM OF ALL RESPONSES TO PHQ QUESTIONS 1-9: 0
2. FEELING DOWN, DEPRESSED OR HOPELESS: NOT AT ALL
2. FEELING DOWN, DEPRESSED OR HOPELESS: NOT AT ALL
SUM OF ALL RESPONSES TO PHQ QUESTIONS 1-9: 0

## 2025-04-22 ENCOUNTER — RESULTS FOLLOW-UP (OUTPATIENT)
Dept: INTERNAL MEDICINE CLINIC | Age: 60
End: 2025-04-22

## 2025-04-22 ENCOUNTER — HOSPITAL ENCOUNTER (OUTPATIENT)
Age: 60
Discharge: HOME | End: 2025-04-22
Payer: COMMERCIAL

## 2025-04-22 DIAGNOSIS — Z00.00 WELL ADULT EXAM: Primary | ICD-10-CM

## 2025-04-22 DIAGNOSIS — Z00.00 WELL ADULT EXAM: ICD-10-CM

## 2025-04-22 LAB
ALBUMIN SERPL-MCNC: 4.3 G/DL (ref 3.4–5)
ALBUMIN/GLOB SERPL: 2 {RATIO} (ref 1.1–2.2)
ALP SERPL-CCNC: 50 U/L (ref 40–129)
ALT SERPL-CCNC: 23 U/L (ref 10–40)
ANION GAP SERPL CALCULATED.3IONS-SCNC: 10 MMOL/L (ref 3–16)
AST SERPL-CCNC: 26 U/L (ref 15–37)
BASOPHILS # BLD: 0 K/UL (ref 0–0.2)
BASOPHILS NFR BLD: 0.7 %
BILIRUB SERPL-MCNC: 0.4 MG/DL (ref 0–1)
BUN SERPL-MCNC: 17 MG/DL (ref 7–20)
CALCIUM SERPL-MCNC: 9 MG/DL (ref 8.3–10.6)
CHLORIDE SERPL-SCNC: 104 MMOL/L (ref 99–110)
CHOLEST SERPL-MCNC: 148 MG/DL (ref 0–199)
CO2 SERPL-SCNC: 25 MMOL/L (ref 21–32)
CREAT SERPL-MCNC: 0.9 MG/DL (ref 0.8–1.3)
DEPRECATED RDW RBC AUTO: 13 % (ref 12.4–15.4)
EOSINOPHIL # BLD: 0.4 K/UL (ref 0–0.6)
EOSINOPHIL NFR BLD: 6 %
EST. AVERAGE GLUCOSE BLD GHB EST-MCNC: 119.8 MG/DL
GFR SERPLBLD CREATININE-BSD FMLA CKD-EPI: >90 ML/MIN/{1.73_M2}
GLUCOSE SERPL-MCNC: 99 MG/DL (ref 70–99)
HBA1C MFR BLD: 5.8 %
HCT VFR BLD AUTO: 39.6 % (ref 40.5–52.5)
HDLC SERPL-MCNC: 37 MG/DL (ref 40–60)
HGB BLD-MCNC: 13.3 G/DL (ref 13.5–17.5)
LDLC SERPL CALC-MCNC: 94 MG/DL
LYMPHOCYTES # BLD: 1.7 K/UL (ref 1–5.1)
LYMPHOCYTES NFR BLD: 27.9 %
MCH RBC QN AUTO: 29.8 PG (ref 26–34)
MCHC RBC AUTO-ENTMCNC: 33.6 G/DL (ref 31–36)
MCV RBC AUTO: 88.9 FL (ref 80–100)
MONOCYTES # BLD: 0.7 K/UL (ref 0–1.3)
MONOCYTES NFR BLD: 11.9 %
NEUTROPHILS # BLD: 3.2 K/UL (ref 1.7–7.7)
NEUTROPHILS NFR BLD: 53.5 %
PLATELET # BLD AUTO: 198 K/UL (ref 135–450)
PMV BLD AUTO: 9.3 FL (ref 5–10.5)
POTASSIUM SERPL-SCNC: 4.4 MMOL/L (ref 3.5–5.1)
PROT SERPL-MCNC: 6.5 G/DL (ref 6.4–8.2)
PSA SERPL DL<=0.01 NG/ML-MCNC: 1.27 NG/ML (ref 0–4)
RBC # BLD AUTO: 4.46 M/UL (ref 4.2–5.9)
SODIUM SERPL-SCNC: 139 MMOL/L (ref 136–145)
TRIGL SERPL-MCNC: 84 MG/DL (ref 0–150)
VLDLC SERPL CALC-MCNC: 17 MG/DL
WBC # BLD AUTO: 5.9 K/UL (ref 4–11)

## 2025-04-22 PROCEDURE — 84153 ASSAY OF PSA TOTAL: CPT

## 2025-04-22 PROCEDURE — 36415 COLL VENOUS BLD VENIPUNCTURE: CPT

## 2025-04-22 PROCEDURE — 80053 COMPREHEN METABOLIC PANEL: CPT

## 2025-04-22 PROCEDURE — 85025 COMPLETE CBC W/AUTO DIFF WBC: CPT

## 2025-04-22 PROCEDURE — 83036 HEMOGLOBIN GLYCOSYLATED A1C: CPT

## 2025-04-22 PROCEDURE — 80061 LIPID PANEL: CPT

## 2025-04-24 ENCOUNTER — OFFICE VISIT (OUTPATIENT)
Dept: INTERNAL MEDICINE CLINIC | Age: 60
End: 2025-04-24
Payer: COMMERCIAL

## 2025-04-24 VITALS
OXYGEN SATURATION: 96 % | HEIGHT: 70 IN | TEMPERATURE: 98.2 F | HEART RATE: 70 BPM | SYSTOLIC BLOOD PRESSURE: 126 MMHG | WEIGHT: 215.4 LBS | BODY MASS INDEX: 30.84 KG/M2 | DIASTOLIC BLOOD PRESSURE: 80 MMHG

## 2025-04-24 DIAGNOSIS — E78.41 ELEVATED LIPOPROTEIN(A): ICD-10-CM

## 2025-04-24 DIAGNOSIS — I10 PRIMARY HYPERTENSION: ICD-10-CM

## 2025-04-24 DIAGNOSIS — E78.2 MIXED HYPERLIPIDEMIA: ICD-10-CM

## 2025-04-24 DIAGNOSIS — Z23 NEED FOR PNEUMOCOCCAL 20-VALENT CONJUGATE VACCINATION: Primary | ICD-10-CM

## 2025-04-24 PROCEDURE — 99214 OFFICE O/P EST MOD 30 MIN: CPT | Performed by: NURSE PRACTITIONER

## 2025-04-24 PROCEDURE — 3074F SYST BP LT 130 MM HG: CPT | Performed by: NURSE PRACTITIONER

## 2025-04-24 PROCEDURE — 90471 IMMUNIZATION ADMIN: CPT | Performed by: NURSE PRACTITIONER

## 2025-04-24 PROCEDURE — 90677 PCV20 VACCINE IM: CPT | Performed by: NURSE PRACTITIONER

## 2025-04-24 PROCEDURE — 3078F DIAST BP <80 MM HG: CPT | Performed by: NURSE PRACTITIONER

## 2025-04-24 SDOH — ECONOMIC STABILITY: FOOD INSECURITY: WITHIN THE PAST 12 MONTHS, THE FOOD YOU BOUGHT JUST DIDN'T LAST AND YOU DIDN'T HAVE MONEY TO GET MORE.: NEVER TRUE

## 2025-04-24 SDOH — ECONOMIC STABILITY: FOOD INSECURITY: WITHIN THE PAST 12 MONTHS, YOU WORRIED THAT YOUR FOOD WOULD RUN OUT BEFORE YOU GOT MONEY TO BUY MORE.: NEVER TRUE

## 2025-04-24 ASSESSMENT — ENCOUNTER SYMPTOMS
NAUSEA: 0
VOMITING: 0
SORE THROAT: 0
CHEST TIGHTNESS: 0
SINUS PAIN: 0
SHORTNESS OF BREATH: 0
DIARRHEA: 0
CONSTIPATION: 0
COUGH: 0

## 2025-04-24 NOTE — PROGRESS NOTES
reviewed personally by me during the office visit.  All problems listed in the assessment are stable unless noted otherwise.  Medication profilereviewed personally by me during the office visit.  Medication side effects and possible impairments from medications were discussed as applicable.     Call if pattern of symptoms change or persists for an extended time.     This document was prepared by a combination of typing and transcription through a voice recognition software.     All medications have the potential for adverse effects. All medications effect each person differently. Please read and review provided information related to medication. If the medication that you have been prescribed has the potential to cause sedation, do not drive or operate car, truck, or heavy machinery until you know how the medication will effect you. If you experience any adverse effects from the medication, please call the office or report to the emergency department.    ANAYELI Garsia-CNP

## 2025-05-02 ENCOUNTER — OFFICE VISIT (OUTPATIENT)
Dept: INTERNAL MEDICINE CLINIC | Age: 60
End: 2025-05-02

## 2025-05-02 VITALS
WEIGHT: 216 LBS | TEMPERATURE: 97.9 F | BODY MASS INDEX: 30.92 KG/M2 | OXYGEN SATURATION: 96 % | SYSTOLIC BLOOD PRESSURE: 120 MMHG | DIASTOLIC BLOOD PRESSURE: 80 MMHG | HEART RATE: 61 BPM | HEIGHT: 70 IN

## 2025-05-02 DIAGNOSIS — H61.23 IMPACTED CERUMEN OF BOTH EARS: Primary | ICD-10-CM

## 2025-05-02 RX ORDER — AMOXICILLIN 875 MG/1
875 TABLET, COATED ORAL 2 TIMES DAILY
Qty: 14 TABLET | Refills: 0 | Status: SHIPPED | OUTPATIENT
Start: 2025-05-02 | End: 2025-05-09

## 2025-05-02 ASSESSMENT — ENCOUNTER SYMPTOMS
DIARRHEA: 0
NAUSEA: 0
SORE THROAT: 0
CONSTIPATION: 0
SHORTNESS OF BREATH: 0
SINUS PRESSURE: 1
RHINORRHEA: 0
SINUS PAIN: 0
COUGH: 0
VOMITING: 0
CHEST TIGHTNESS: 0

## 2025-05-02 NOTE — PROGRESS NOTES
Tympanic membrane is not erythematous, retracted or bulging. Tympanic membrane has normal mobility.      Nose: Nose normal.   Eyes:      General: Lids are normal. Lids are everted, no foreign bodies appreciated.      Conjunctiva/sclera: Conjunctivae normal.      Pupils: Pupils are equal, round, and reactive to light.   Neck:      Thyroid: No thyroid mass.      Vascular: Normal carotid pulses. No carotid bruit or JVD.   Cardiovascular:      Rate and Rhythm: Normal rate and regular rhythm. No extrasystoles are present.     Chest Wall: PMI is not displaced.      Pulses:           Radial pulses are 2+ on the right side and 2+ on the left side.        Dorsalis pedis pulses are 2+ on the right side and 2+ on the left side.      Heart sounds: Normal heart sounds, S1 normal and S2 normal. Heart sounds not distant. No murmur heard.     No friction rub. No gallop. No S3 or S4 sounds.   Pulmonary:      Effort: Pulmonary effort is normal. No respiratory distress.      Breath sounds: Normal breath sounds. No decreased breath sounds, wheezing, rhonchi or rales.   Abdominal:      General: Bowel sounds are normal. There is no distension.      Palpations: Abdomen is soft.      Tenderness: There is no abdominal tenderness. There is no rebound.   Musculoskeletal:         General: Normal range of motion.      Cervical back: Full passive range of motion without pain, normal range of motion and neck supple.   Skin:     General: Skin is warm and dry.   Neurological:      Cranial Nerves: No cranial nerve deficit.   Psychiatric:         Speech: Speech normal.         Behavior: Behavior normal.         Thought Content: Thought content normal.         Judgment: Judgment normal.             The patient (or guardian, if applicable) and other individuals in attendance with the patient were advised that Artificial Intelligence will be utilized during this visit to record and process the conversation to generate a clinical note. The patient (or

## 2025-06-10 DIAGNOSIS — I25.10 CORONARY ARTERY DISEASE INVOLVING NATIVE CORONARY ARTERY OF NATIVE HEART WITHOUT ANGINA PECTORIS: ICD-10-CM

## 2025-06-10 DIAGNOSIS — E78.2 MIXED HYPERLIPIDEMIA: ICD-10-CM

## 2025-06-10 RX ORDER — EZETIMIBE 10 MG/1
10 TABLET ORAL DAILY
Qty: 90 TABLET | Refills: 3 | Status: SHIPPED | OUTPATIENT
Start: 2025-06-10

## 2025-06-10 RX ORDER — ROSUVASTATIN CALCIUM 40 MG/1
40 TABLET, COATED ORAL DAILY
Qty: 90 TABLET | Refills: 3 | Status: SHIPPED | OUTPATIENT
Start: 2025-06-10

## 2025-06-10 RX ORDER — AMLODIPINE BESYLATE 5 MG/1
5 TABLET ORAL DAILY
Qty: 90 TABLET | Refills: 3 | Status: SHIPPED | OUTPATIENT
Start: 2025-06-10

## 2025-06-10 NOTE — TELEPHONE ENCOUNTER
Refill request for ZETIA, AMLODIPINE, ROSUVASTATIN medication.     Name of Pharmacy- [facility]      Last visit - 5/2/25     Pending visit - 10/24/25    Last refill -6/12/25, 3/12/25      Medication Contract signed -   Last Oarrs ran-         Additional Comments